# Patient Record
Sex: MALE | Race: WHITE | NOT HISPANIC OR LATINO | Employment: OTHER | URBAN - METROPOLITAN AREA
[De-identification: names, ages, dates, MRNs, and addresses within clinical notes are randomized per-mention and may not be internally consistent; named-entity substitution may affect disease eponyms.]

---

## 2017-01-02 ENCOUNTER — HOSPITAL ENCOUNTER (OUTPATIENT)
Facility: HOSPITAL | Age: 57
Setting detail: OBSERVATION
LOS: 1 days | Discharge: HOME | End: 2017-01-07
Attending: EMERGENCY MEDICINE | Admitting: INTERNAL MEDICINE

## 2017-01-02 DIAGNOSIS — N13.30 HYDRONEPHROSIS, LEFT: ICD-10-CM

## 2017-01-02 DIAGNOSIS — I10 ESSENTIAL HYPERTENSION: ICD-10-CM

## 2017-01-02 DIAGNOSIS — N23 RENAL COLIC ON LEFT SIDE: ICD-10-CM

## 2017-01-02 DIAGNOSIS — IMO0001 INSULIN DEPENDENT DIABETES MELLITUS: ICD-10-CM

## 2017-01-02 DIAGNOSIS — N20.1 URETEROLITHIASIS: Primary | ICD-10-CM

## 2017-01-02 DIAGNOSIS — N13.4 HYDROURETER, LEFT: ICD-10-CM

## 2017-01-02 PROCEDURE — 96374 THER/PROPH/DIAG INJ IV PUSH: CPT

## 2017-01-02 PROCEDURE — 99283 EMERGENCY DEPT VISIT LOW MDM: CPT

## 2017-01-02 NOTE — IP AVS SNAPSHOT
AFTER VISIT SUMMARY             Thomas Felder           About your hospitalization     You were admitted on:  January 3, 2017 You last received care in the:  [address]       Procedures & Surgeries      Procedure(s) (LRB):  LEFT URETEROSCOPY WITH  LASER LITHOTRIPSY, AND  STONE BASKETING AND LEFT URETERAL STENT (Left)     1/2/2017 - 1/6/2017     Surgeon(s):  Alex Stanton MD  -------------------      Medications    If you or your caregiver advised us that you are currently taking a medication and that medication is marked below as “Resume”, this simply indicates that we have reviewed those medications to make sure our new therapy recommendations do not interfere.  If you have concerns about medications other than those new ones which we are prescribing today, please consult the physician who prescribed them (or your primary physician).  Our review of your home medications is not meant to indicate that we are directing their use.             Your Medications      START taking these medications     levoFLOXacin 500 MG tablet   Take 1 tablet by mouth Daily for 3 days. Indications: Urinary Tract Infection   Last time this was given:  1/6/2017  5:00 PM   Commonly known as:  LEVAQUIN           oxyCODONE-acetaminophen 7.5-325 MG per tablet   Take 1 tablet by mouth Every 4 (Four) Hours As Needed for severe pain (7-10).   Commonly known as:  PERCOCET             CONTINUE taking these medications     albuterol 108 (90 BASE) MCG/ACT inhaler   Inhale 2 puffs Every 4 (Four) Hours As Needed for wheezing.   Commonly known as:  PROVENTIL HFA;VENTOLIN HFA           aspirin 325 MG tablet   Take 325 mg by mouth Daily.   Last time this was given:  1/7/2017  8:22 AM           cetirizine 10 MG tablet   Take 10 mg by mouth Daily.   Last time this was given:  1/7/2017  8:22 AM   Commonly known as:  zyrTEC           dicyclomine 20 MG tablet   Take 20 mg by mouth Daily As Needed.   Commonly known as:  BENTYL           furosemide 40 MG tablet   Take 40 mg by mouth Daily.   Commonly known as:  LASIX           HUMIRA 40 MG/0.8ML Prefilled Syringe Kit injection   Inject 40 mg under the skin 1 (One) Time.   Generic drug:  adalimumab           HYDROcodone-acetaminophen  MG per tablet   Take 1 tablet by mouth 2 (Two) Times a Day.   Last time this was given:  1/7/2017  8:22 AM   Commonly known as:  NORCO           insulin aspart 100 UNIT/ML solution pen-injector sc pen   Inject 30 Units under the skin 3 (Three) Times a Day With Meals.   Commonly known as:  novoLOG FLEXPEN           insulin aspart prot-insulin aspart (70-30) 100 UNIT/ML injection   Inject 60 Units under the skin 2 (Two) Times a Day With Meals.   Commonly known as:  novoLOG 70/30           INVOKANA PO   Take 100 mg by mouth Daily.           isosorbide mononitrate 30 MG 24 hr tablet   Take 30 mg by mouth Daily.   Last time this was given:  1/7/2017  8:22 AM   Commonly known as:  IMDUR           lisinopril 2.5 MG tablet   Take 2.5 mg by mouth Daily.   Commonly known as:  PRINIVIL,ZESTRIL           metFORMIN 1000 MG tablet   Take 1,000 mg by mouth 2 (Two) Times a Day With Meals.   Commonly known as:  GLUCOPHAGE           metoprolol succinate  MG 24 hr tablet   Take 100 mg by mouth 2 (Two) Times a Day.   Last time this was given:  1/7/2017  8:22 AM   Commonly known as:  TOPROL-XL           omeprazole 20 MG capsule   Take 20 mg by mouth Daily.   Commonly known as:  priLOSEC           potassium chloride 20 MEQ packet   Take 20 mEq by mouth Daily.   Commonly known as:  KLOR-CON           raNITIdine 150 MG tablet   Take 150 mg by mouth 2 (Two) Times a Day.   Commonly known as:  ZANTAC           VICTOZA 18 MG/3ML solution pen-injector   Inject 1.8 mg under the skin.   Generic drug:  Liraglutide                Where to Get Your Medications      These medications were sent to [facility] [address] [phone] [facility] [phone]   [address] [address]     Phone:  [phone]     levoFLOXacin 500 MG tablet         Information about where to get these medications is not yet available     ! Ask your nurse or doctor about these medications     oxyCODONE-acetaminophen 7.5-325 MG per tablet                  Your Medications      Your Medication List           Morning Noon Evening Bedtime As Needed    albuterol 108 (90 BASE) MCG/ACT inhaler   Inhale 2 puffs Every 4 (Four) Hours As Needed for wheezing.   Commonly known as:  PROVENTIL HFA;VENTOLIN HFA                                   aspirin 325 MG tablet   Take 325 mg by mouth Daily.                                   cetirizine 10 MG tablet   Take 10 mg by mouth Daily.   Commonly known as:  zyrTEC                                   dicyclomine 20 MG tablet   Take 20 mg by mouth Daily As Needed.   Commonly known as:  BENTYL                                   furosemide 40 MG tablet   Take 40 mg by mouth Daily.   Commonly known as:  LASIX                                   HUMIRA 40 MG/0.8ML Prefilled Syringe Kit injection   Inject 40 mg under the skin 1 (One) Time.   Generic drug:  adalimumab                                HYDROcodone-acetaminophen  MG per tablet   Take 1 tablet by mouth 2 (Two) Times a Day.   Commonly known as:  NORCO                                      insulin aspart 100 UNIT/ML solution pen-injector sc pen   Inject 30 Units under the skin 3 (Three) Times a Day With Meals.   Commonly known as:  novoLOG FLEXPEN                                            insulin aspart prot-insulin aspart (70-30) 100 UNIT/ML injection   Inject 60 Units under the skin 2 (Two) Times a Day With Meals.   Commonly known as:  novoLOG 70/30                                      INVOKANA PO   Take 100 mg by mouth Daily.                                   isosorbide mononitrate 30 MG 24 hr tablet   Take 30 mg by mouth Daily.   Commonly known as:  IMDUR                                    levoFLOXacin 500 MG tablet   Take 1 tablet by mouth Daily for 3 days. Indications: Urinary Tract Infection   Commonly known as:  LEVAQUIN                                   lisinopril 2.5 MG tablet   Take 2.5 mg by mouth Daily.   Commonly known as:  PRINIVIL,ZESTRIL                                   metFORMIN 1000 MG tablet   Take 1,000 mg by mouth 2 (Two) Times a Day With Meals.   Commonly known as:  GLUCOPHAGE                                metoprolol succinate  MG 24 hr tablet   Take 100 mg by mouth 2 (Two) Times a Day.   Commonly known as:  TOPROL-XL                                   omeprazole 20 MG capsule   Take 20 mg by mouth Daily.   Commonly known as:  priLOSEC                                   oxyCODONE-acetaminophen 7.5-325 MG per tablet   Take 1 tablet by mouth Every 4 (Four) Hours As Needed for severe pain (7-10).   Commonly known as:  PERCOCET                                   potassium chloride 20 MEQ packet   Take 20 mEq by mouth Daily.   Commonly known as:  KLOR-CON                                   raNITIdine 150 MG tablet   Take 150 mg by mouth 2 (Two) Times a Day.   Commonly known as:  ZANTAC                                      VICTOZA 18 MG/3ML solution pen-injector   Inject 1.8 mg under the skin.   Generic drug:  Liraglutide                                         Instructions for After Discharge        Discharge References/Attachments     LITHOTRIPSY FOR KIDNEY STONES (ENGLISH)    LITHOTRIPSY, CARE AFTER (ENGLISH)    KIDNEY STONES (ENGLISH)       Follow-ups for After Discharge        [employer] Signup     [facility] [employer] allows you to send messages to your doctor, view your test results, renew your prescriptions, schedule appointments, and more. To sign up, go to [employer] and click on the Sign Up Now link in the New User? box. Enter your [employer] Activation Code exactly as it appears below along with the last four digits of your Social Security Number and your  Date of Birth () to complete the sign-up process. If you do not sign up before the expiration date, you must request a new code.    [employer] Activation Code: [email or web address]  Expires: 2017  1:55 PM    If you have questions, you can email [email or web address] or call [phone] to talk to our [email or web address] staff. Remember, [email or web address] is NOT to be used for urgent needs. For medical emergencies, dial 911.           Summary of Your Hospitalization        Reason for Hospitalization     Your primary diagnosis was:  Ureterolithiasis    Your diagnoses also included:  High Blood Pressure, Spasmodic Colon, Diabetes      Care Providers     Provider Service Role Specialty    Rosie Martines II,  Medicine Attending Provider Hospitalist    Alex Stanton MD Urology Consulting Physician  Urology    Alex Statnon MD Urology Surgeon  Urology      Your Allergies  Date Reviewed: 2017    Allergen Reactions    Sulfa Antibiotics Swelling    Generalized swelling       Patient Belongings Returned     Document Return of Belongings Flowsheet     Were the patient bedside belongings sent home?   Yes   Belongings Retrieved from Security & Sent Home   N/A    Belongings Sent to Safe   --   Medications Retrieved from Pharmacy & Sent Home   N/A              More Information      Lithotripsy  Lithotripsy is a treatment that can sometimes help eliminate kidney stones and pain that they cause. A form of lithotripsy, also known as extracorporeal shock wave lithotripsy, is a nonsurgical procedure that helps your body rid itself of the kidney stone when it is too big to pass on its own. Extracorporeal shock wave lithotripsy is a method of crushing a kidney stone with shock waves. These shock waves pass through your body and are focused on your stone. They cause the kidney stones to crumble while still in the urinary tract. It is then easier for the smaller pieces of stone to pass in the urine.  Lithotripsy usually takes about an hour. It  is done in a hospital, a lithotripsy center, or a mobile unit. It usually does not require an overnight stay. Your health care provider will instruct you on preparation for the procedure. Your health care provider will tell you what to expect afterward.  LET YOUR HEALTH CARE PROVIDER KNOW ABOUT:  · Any allergies you have.  · All medicines you are taking, including vitamins, herbs, eye drops, creams, and over-the-counter medicines.  · Previous problems you or members of your family have had with the use of anesthetics.  · Any blood disorders you have.  · Previous surgeries you have had.  · Medical conditions you have.  RISKS AND COMPLICATIONS  Generally, lithotripsy for kidney stones is a safe procedure. However, as with any procedure, complications can occur. Possible complications include:  · Infection.  · Bleeding of the kidney.  · Bruising of the kidney or skin.  · Obstruction of the ureter.  · Failure of the stone to fragment.  BEFORE THE PROCEDURE  · Do not eat or drink for 6-8 hours prior to the procedure. You may, however, take the medications with a sip of water that your physician instructs you to take  · Do not take aspirin or aspirin-containing products for 7 days prior to your procedure  · Do not take nonsteroidal anti-inflammatory products for 7 days prior to your procedure  PROCEDURE  A stent (flexible tube with holes) may be placed in your ureter. The ureter is the tube that transports the urine from the kidneys to the bladder. Your health care provider may place a stent before the procedure. This will help keep urine flowing from the kidney if the fragments of the stone block the ureter. You may have an IV tube placed in one of your veins to give you fluids and medicines. These medicines may help you relax or make you sleep. During the procedure, you will lie comfortably on a fluid-filled cushion or in a warm-water bath. After an X-ray or ultrasound exam to locate your stone, shock waves are aimed at  the stone. If you are awake, you may feel a tapping sensation as the shock waves pass through your body. If large stone particles remain after treatment, a second procedure may be necessary at a later date.  For comfort during the test:  · Relax as much as possible.  · Try to remain still as much as possible.  · Try to follow instructions to speed up the test.  · Let your health care provider know if you are uncomfortable, anxious, or in pain.  AFTER THE PROCEDURE   After surgery, you will be taken to the recovery area. A nurse will watch and check your progress. Once you're awake, stable, and taking fluids well, you will be allowed to go home as long as there are no problems. You will also be allowed to pass your urine before discharge. You may be given antibiotics to help prevent infection. You may also be prescribed pain medicine if needed. In a week or two, your health care provider may remove your stent, if you have one. You may first have an X-ray exam to check on how successful the fragmentation of your stone has been and how much of the stone has passed. Your health care provider will check to see whether or not stone particles remain.  SEEK IMMEDIATE MEDICAL CARE IF:  · You develop a fever or shaking chills.  · Your pain is not relieved by medicine.  · You feel sick to your stomach (nauseated) and you vomit.  · You develop heavy bleeding.  · You have difficulty urinating.  · You start to pass your stent from your penis.     This information is not intended to replace advice given to you by your health care provider. Make sure you discuss any questions you have with your health care provider.     Document Released: 12/15/2001 Document Revised: 01/08/2016 Document Reviewed: 07/03/2014  [employer] Interactive Patient Education ©2016 [employer]          Lithotripsy, Care After  Refer to this sheet in the next few weeks. These instructions provide you with information on caring for yourself after your  procedure. Your health care provider may also give you more specific instructions. Your treatment has been planned according to current medical practices, but problems sometimes occur. Call your health care provider if you have any problems or questions after your procedure.  WHAT TO EXPECT AFTER THE PROCEDURE   · Your urine may have a red tinge for a few days after treatment. Blood loss is usually minimal.  · You may have soreness in the back or flank area. This usually goes away after a few days. The procedure can cause blotches or bruises on the back where the pressure wave enters the skin. These marks usually cause only minimal discomfort and should disappear in a short time.  · Stone fragments should begin to pass within 24 hours of treatment. However, a delayed passage is not unusual.  · You may have pain, discomfort, and feel sick to your stomach (nauseated) when the crushed fragments of stone are passed down the tube from the kidney to the bladder. Stone fragments can pass soon after the procedure and may last for up to 4-8 weeks.  · A small number of patients may have severe pain when stone fragments are not able to pass, which leads to an obstruction.  · If your stone is greater than 1 inch (2.5 cm) in diameter or if you have multiple stones that have a combined diameter greater than 1 inch (2.5 cm), you may require more than one treatment.  · If you had a stent placed prior to your procedure, you may experience some discomfort, especially during urination. You may experience the pain or discomfort in your flank or back, or you may experience a sharp pain or discomfort at the base of your penis or in your lower abdomen. The discomfort usually lasts only a few minutes after urinating.  HOME CARE INSTRUCTIONS   · Rest at home until you feel your energy improving.  · Only take over-the-counter or prescription medicines for pain, discomfort, or fever as directed by your health care provider. Depending on the  "type of lithotripsy, you may need to take antibiotics and anti-inflammatory medicines for a few days.  · Drink enough water and fluids to keep your urine clear or pale yellow. This helps \"flush\" your kidneys. It helps pass any remaining pieces of stone and prevents stones from coming back.  · Most people can resume daily activities within 1-2 days after standard lithotripsy. It can take longer to recover from laser and percutaneous lithotripsy.  · Strain all urine through the provided strainer. Keep all particulate matter and stones for your health care provider to see. The stone may be as small as a grain of salt. It is very important to use the strainer each and every time you pass your urine. Any stones that are found can be sent to a medical lab for examination.  · Visit your health care provider for a follow-up appointment in a few weeks. Your doctor may remove your stent if you have one. Your health care provider will also check to see whether stone particles still remain.  SEEK MEDICAL CARE IF:   · Your pain is not relieved by medicine.  · You have a lasting nauseous feeling.  · You feel there is too much blood in the urine.  · You develop persistent problems with frequent or painful urination that does not at least partially improve after 2 days following the procedure.  · You have a congested cough.  · You feel lightheaded.  · You develop a rash or any other signs that might suggest an allergic problem.  · You develop any reaction or side effects to your medicine(s).  SEEK IMMEDIATE MEDICAL CARE IF:   · You experience severe back or flank pain or both.  · You see nothing but blood when you urinate.  · You cannot pass any urine at all.  · You have a fever or shaking chills.  · You develop shortness of breath, difficulty breathing, or chest pain.  · You develop vomiting that will not stop after 6-8 hours.  · You have a fainting episode.     This information is not intended to replace advice given to you by " your health care provider. Make sure you discuss any questions you have with your health care provider.     Document Released: 01/06/2009 Document Revised: 09/07/2016 Document Reviewed: 07/03/2014  [employer] Interactive Patient Education ©2016 [employer]          Kidney Stones  Kidney stones (urolithiasis) are deposits that form inside your kidneys. The intense pain is caused by the stone moving through the urinary tract. When the stone moves, the ureter goes into spasm around the stone. The stone is usually passed in the urine.   CAUSES   · A disorder that makes certain neck glands produce too much parathyroid hormone (primary hyperparathyroidism).  · A buildup of uric acid crystals, similar to gout in your joints.  · Narrowing (stricture) of the ureter.  · A kidney obstruction present at birth (congenital obstruction).  · Previous surgery on the kidney or ureters.  · Numerous kidney infections.  SYMPTOMS   · Feeling sick to your stomach (nauseous).  · Throwing up (vomiting).  · Blood in the urine (hematuria).  · Pain that usually spreads (radiates) to the groin.  · Frequency or urgency of urination.  DIAGNOSIS   · Taking a history and physical exam.  · Blood or urine tests.  · CT scan.  · Occasionally, an examination of the inside of the urinary bladder (cystoscopy) is performed.  TREATMENT   · Observation.  · Increasing your fluid intake.  · Extracorporeal shock wave lithotripsy--This is a noninvasive procedure that uses shock waves to break up kidney stones.  · Surgery may be needed if you have severe pain or persistent obstruction. There are various surgical procedures. Most of the procedures are performed with the use of small instruments. Only small incisions are needed to accommodate these instruments, so recovery time is minimized.  The size, location, and chemical composition are all important variables that will determine the proper choice of action for you. Talk to your health care provider to better  understand your situation so that you will minimize the risk of injury to yourself and your kidney.   HOME CARE INSTRUCTIONS   · Drink enough water and fluids to keep your urine clear or pale yellow. This will help you to pass the stone or stone fragments.  · Strain all urine through the provided strainer. Keep all particulate matter and stones for your health care provider to see. The stone causing the pain may be as small as a grain of salt. It is very important to use the strainer each and every time you pass your urine. The collection of your stone will allow your health care provider to analyze it and verify that a stone has actually passed. The stone analysis will often identify what you can do to reduce the incidence of recurrences.  · Only take over-the-counter or prescription medicines for pain, discomfort, or fever as directed by your health care provider.  · Keep all follow-up visits as told by your health care provider. This is important.  · Get follow-up X-rays if required. The absence of pain does not always mean that the stone has passed. It may have only stopped moving. If the urine remains completely obstructed, it can cause loss of kidney function or even complete destruction of the kidney. It is your responsibility to make sure X-rays and follow-ups are completed. Ultrasounds of the kidney can show blockages and the status of the kidney. Ultrasounds are not associated with any radiation and can be performed easily in a matter of minutes.  · Make changes to your daily diet as told by your health care provider. You may be told to:  ¨ Limit the amount of salt that you eat.  ¨ Eat 5 or more servings of fruits and vegetables each day.  ¨ Limit the amount of meat, poultry, fish, and eggs that you eat.  · Collect a 24-hour urine sample as told by your health care provider. You may need to collect another urine sample every 6-12 months.  SEEK MEDICAL CARE IF:  · You experience pain that is progressive and  unresponsive to any pain medicine you have been prescribed.  SEEK IMMEDIATE MEDICAL CARE IF:   · Pain cannot be controlled with the prescribed medicine.  · You have a fever or shaking chills.  · The severity or intensity of pain increases over 18 hours and is not relieved by pain medicine.  · You develop a new onset of abdominal pain.  · You feel faint or pass out.  · You are unable to urinate.     This information is not intended to replace advice given to you by your health care provider. Make sure you discuss any questions you have with your health care provider.     Document Released: 12/18/2006 Document Revised: 09/07/2016 Document Reviewed: 05/21/2014  [employer] Interactive Patient Education ©2016 [employer]            SYMPTOMS OF A STROKE    Call 911 or have someone take you to the Emergency Department if you have any of the following:    · Sudden numbness or weakness of your face, arm or leg especially on one side of the body  · Sudden confusion, diffiiculty speaking or trouble understanding   · Changes in your vision or loss of sight in one eye  · Sudden severe headache with no known cause  · sudden dizziness, trouble walking, loss of balance or coordination    It is important to seek emergency care right away if you have further stroke symptoms. If you get emergency help quickly, the powerful clot-dissolving medicines can reduce the disabilities caused by a stroke.     For more information:    American Stroke Association  1-988-1-STROKE  www.strokeassociation.org           IF YOU SMOKE OR USE TOBACCO PLEASE READ THE FOLLOWING:    Why is smoking bad for me?  Smoking increases the risk of heart disease, lung disease, vascular disease, stroke, and cancer.     If you smoke, STOP!    If you would like more information on quitting smoking, please visit the [employer] website: [email or web address].co/iBoxPayate/healthier-[email or web address]   This link will provide additional resources including the QUIT  line and the Beat the Pack support groups.     For more information:    American Cancer Society  (231) 495-7747    American Heart Association  1-267.363.1555               YOU ARE THE MOST IMPORTANT FACTOR IN YOUR RECOVERY.     Follow all instructions carefully.     I have reviewed my discharge instructions with my nurse, including the following information, if applicable:     Information about my illness and diagnosis   Follow up appointments (including lab draws)   Wound Care   Equipment Needs   Medications (new and continuing) along with side effects   Preventative information such as vaccines and smoking cessations   Diet   Pain   I know when to contact my Doctor's office or seek emergency care      I want my nurse to describe the side effects of my medications: YES NO   If the answer is no, I understand the side effects of my medications: YES NO   My nurse described the side effects of my medications in a way that I could understand: YES NO   I have taken my personal belongings and my own medications with me at discharge: YES NO            I have received this information and my questions have been answered. I have discussed any concerns I see with this plan with the nurse or physician. I understand these instructions.    Signature of Patient or Responsible Person: _____________________________________    Date: _________________  Time: __________________    Signature of Healthcare Provider: _______________________________________  Date: _________________  Time: __________________

## 2017-01-03 ENCOUNTER — APPOINTMENT (OUTPATIENT)
Dept: CT IMAGING | Facility: HOSPITAL | Age: 57
End: 2017-01-03

## 2017-01-03 PROBLEM — K58.9 IBS (IRRITABLE BOWEL SYNDROME): Status: ACTIVE | Noted: 2017-01-03

## 2017-01-03 PROBLEM — E11.9 DIABETES (HCC): Status: ACTIVE | Noted: 2017-01-03

## 2017-01-03 PROBLEM — N20.1 URETEROLITHIASIS: Status: ACTIVE | Noted: 2017-01-03

## 2017-01-03 PROBLEM — I10 ESSENTIAL HYPERTENSION: Status: ACTIVE | Noted: 2017-01-03

## 2017-01-03 LAB
ALBUMIN SERPL-MCNC: 4.4 G/DL (ref 3.2–4.8)
ALBUMIN/GLOB SERPL: 1.5 G/DL (ref 1.5–2.5)
ALP SERPL-CCNC: 115 U/L (ref 25–100)
ALT SERPL W P-5'-P-CCNC: 50 U/L (ref 7–40)
ANION GAP SERPL CALCULATED.3IONS-SCNC: 5 MMOL/L (ref 3–11)
AST SERPL-CCNC: 49 U/L (ref 0–33)
BACTERIA UR QL AUTO: ABNORMAL /HPF
BASOPHILS # BLD AUTO: 0.02 10*3/MM3 (ref 0–0.2)
BASOPHILS NFR BLD AUTO: 0.2 % (ref 0–1)
BILIRUB SERPL-MCNC: 0.5 MG/DL (ref 0.3–1.2)
BILIRUB UR QL STRIP: NEGATIVE
BUN BLD-MCNC: 9 MG/DL (ref 9–23)
BUN/CREAT SERPL: 11.3 (ref 7–25)
CALCIUM SPEC-SCNC: 9.8 MG/DL (ref 8.7–10.4)
CHLORIDE SERPL-SCNC: 102 MMOL/L (ref 99–109)
CLARITY UR: ABNORMAL
CO2 SERPL-SCNC: 34 MMOL/L (ref 20–31)
COLOR UR: YELLOW
CREAT BLD-MCNC: 0.8 MG/DL (ref 0.6–1.3)
DEPRECATED RDW RBC AUTO: 44.2 FL (ref 37–54)
EOSINOPHIL # BLD AUTO: 0.17 10*3/MM3 (ref 0.1–0.3)
EOSINOPHIL NFR BLD AUTO: 2 % (ref 0–3)
ERYTHROCYTE [DISTWIDTH] IN BLOOD BY AUTOMATED COUNT: 13.4 % (ref 11.3–14.5)
GFR SERPL CREATININE-BSD FRML MDRD: 100 ML/MIN/1.73
GLOBULIN UR ELPH-MCNC: 3 GM/DL
GLUCOSE BLD-MCNC: 169 MG/DL (ref 70–100)
GLUCOSE BLDC GLUCOMTR-MCNC: 144 MG/DL (ref 70–130)
GLUCOSE BLDC GLUCOMTR-MCNC: 169 MG/DL (ref 70–130)
GLUCOSE BLDC GLUCOMTR-MCNC: 191 MG/DL (ref 70–130)
GLUCOSE BLDC GLUCOMTR-MCNC: 251 MG/DL (ref 70–130)
GLUCOSE UR STRIP-MCNC: ABNORMAL MG/DL
HBA1C MFR BLD: 9.1 % (ref 4.8–5.6)
HCT VFR BLD AUTO: 44.4 % (ref 38.9–50.9)
HGB BLD-MCNC: 14.8 G/DL (ref 13.1–17.5)
HGB UR QL STRIP.AUTO: ABNORMAL
HYALINE CASTS UR QL AUTO: ABNORMAL /LPF
IMM GRANULOCYTES # BLD: 0.02 10*3/MM3 (ref 0–0.03)
IMM GRANULOCYTES NFR BLD: 0.2 % (ref 0–0.6)
KETONES UR QL STRIP: NEGATIVE
LEUKOCYTE ESTERASE UR QL STRIP.AUTO: NEGATIVE
LYMPHOCYTES # BLD AUTO: 2.65 10*3/MM3 (ref 0.6–4.8)
LYMPHOCYTES NFR BLD AUTO: 31.7 % (ref 24–44)
MCH RBC QN AUTO: 30.2 PG (ref 27–31)
MCHC RBC AUTO-ENTMCNC: 33.3 G/DL (ref 32–36)
MCV RBC AUTO: 90.6 FL (ref 80–99)
MONOCYTES # BLD AUTO: 0.66 10*3/MM3 (ref 0–1)
MONOCYTES NFR BLD AUTO: 7.9 % (ref 0–12)
NEUTROPHILS # BLD AUTO: 4.85 10*3/MM3 (ref 1.5–8.3)
NEUTROPHILS NFR BLD AUTO: 58 % (ref 41–71)
NITRITE UR QL STRIP: NEGATIVE
PH UR STRIP.AUTO: 7 [PH] (ref 5–8)
PLATELET # BLD AUTO: 294 10*3/MM3 (ref 150–450)
PMV BLD AUTO: 9 FL (ref 6–12)
POTASSIUM BLD-SCNC: 3.7 MMOL/L (ref 3.5–5.5)
PROT SERPL-MCNC: 7.4 G/DL (ref 5.7–8.2)
PROT UR QL STRIP: NEGATIVE
RBC # BLD AUTO: 4.9 10*6/MM3 (ref 4.2–5.76)
RBC # UR: ABNORMAL /HPF
REF LAB TEST METHOD: ABNORMAL
SODIUM BLD-SCNC: 141 MMOL/L (ref 132–146)
SP GR UR STRIP: 1.02 (ref 1–1.03)
SQUAMOUS #/AREA URNS HPF: ABNORMAL /HPF
UROBILINOGEN UR QL STRIP: ABNORMAL
WBC NRBC COR # BLD: 8.37 10*3/MM3 (ref 3.5–10.8)
WBC UR QL AUTO: ABNORMAL /HPF

## 2017-01-03 PROCEDURE — 25010000002 KETOROLAC TROMETHAMINE PER 15 MG: Performed by: PHYSICIAN ASSISTANT

## 2017-01-03 PROCEDURE — G0378 HOSPITAL OBSERVATION PER HR: HCPCS

## 2017-01-03 PROCEDURE — 99220 PR INITIAL OBSERVATION CARE/DAY 70 MINUTES: CPT | Performed by: INTERNAL MEDICINE

## 2017-01-03 PROCEDURE — 96361 HYDRATE IV INFUSION ADD-ON: CPT

## 2017-01-03 PROCEDURE — 63710000001 INSULIN LISPRO (HUMAN) PER 5 UNITS: Performed by: INTERNAL MEDICINE

## 2017-01-03 PROCEDURE — 80053 COMPREHEN METABOLIC PANEL: CPT | Performed by: PHYSICIAN ASSISTANT

## 2017-01-03 PROCEDURE — 85025 COMPLETE CBC W/AUTO DIFF WBC: CPT | Performed by: PHYSICIAN ASSISTANT

## 2017-01-03 PROCEDURE — 82962 GLUCOSE BLOOD TEST: CPT

## 2017-01-03 PROCEDURE — 63710000001 INSULIN DETEMIR PER 5 UNITS: Performed by: INTERNAL MEDICINE

## 2017-01-03 PROCEDURE — 74176 CT ABD & PELVIS W/O CONTRAST: CPT

## 2017-01-03 PROCEDURE — 83036 HEMOGLOBIN GLYCOSYLATED A1C: CPT | Performed by: INTERNAL MEDICINE

## 2017-01-03 PROCEDURE — 81001 URINALYSIS AUTO W/SCOPE: CPT | Performed by: PHYSICIAN ASSISTANT

## 2017-01-03 RX ORDER — NICOTINE POLACRILEX 4 MG
15 LOZENGE BUCCAL AS NEEDED
Status: DISCONTINUED | OUTPATIENT
Start: 2017-01-03 | End: 2017-01-07 | Stop reason: HOSPADM

## 2017-01-03 RX ORDER — DEXTROSE MONOHYDRATE 25 G/50ML
25 INJECTION, SOLUTION INTRAVENOUS AS NEEDED
Status: DISCONTINUED | OUTPATIENT
Start: 2017-01-03 | End: 2017-01-07 | Stop reason: HOSPADM

## 2017-01-03 RX ORDER — CETIRIZINE HYDROCHLORIDE 10 MG/1
10 TABLET ORAL DAILY
COMMUNITY

## 2017-01-03 RX ORDER — RANITIDINE 150 MG/1
150 TABLET ORAL 2 TIMES DAILY
COMMUNITY

## 2017-01-03 RX ORDER — ONDANSETRON 2 MG/ML
4 INJECTION INTRAMUSCULAR; INTRAVENOUS ONCE
Status: COMPLETED | OUTPATIENT
Start: 2017-01-03 | End: 2017-01-06

## 2017-01-03 RX ORDER — HYDROCODONE BITARTRATE AND ACETAMINOPHEN 5; 325 MG/1; MG/1
1 TABLET ORAL ONCE
Status: COMPLETED | OUTPATIENT
Start: 2017-01-03 | End: 2017-01-03

## 2017-01-03 RX ORDER — CETIRIZINE HYDROCHLORIDE 10 MG/1
10 TABLET ORAL DAILY
Status: DISCONTINUED | OUTPATIENT
Start: 2017-01-03 | End: 2017-01-07 | Stop reason: HOSPADM

## 2017-01-03 RX ORDER — METOPROLOL SUCCINATE 100 MG/1
100 TABLET, EXTENDED RELEASE ORAL 2 TIMES DAILY
Status: DISCONTINUED | OUTPATIENT
Start: 2017-01-03 | End: 2017-01-07 | Stop reason: HOSPADM

## 2017-01-03 RX ORDER — METOPROLOL SUCCINATE 100 MG/1
100 TABLET, EXTENDED RELEASE ORAL 2 TIMES DAILY
COMMUNITY

## 2017-01-03 RX ORDER — ACETAMINOPHEN 325 MG/1
650 TABLET ORAL EVERY 6 HOURS PRN
Status: DISCONTINUED | OUTPATIENT
Start: 2017-01-03 | End: 2017-01-07 | Stop reason: HOSPADM

## 2017-01-03 RX ORDER — ASPIRIN 325 MG
325 TABLET ORAL DAILY
COMMUNITY

## 2017-01-03 RX ORDER — FUROSEMIDE 40 MG/1
40 TABLET ORAL DAILY
COMMUNITY

## 2017-01-03 RX ORDER — OMEPRAZOLE 20 MG/1
20 CAPSULE, DELAYED RELEASE ORAL DAILY
COMMUNITY

## 2017-01-03 RX ORDER — TAMSULOSIN HYDROCHLORIDE 0.4 MG/1
0.4 CAPSULE ORAL ONCE
Status: COMPLETED | OUTPATIENT
Start: 2017-01-03 | End: 2017-01-03

## 2017-01-03 RX ORDER — HYDROCODONE BITARTRATE AND ACETAMINOPHEN 10; 325 MG/1; MG/1
1 TABLET ORAL 2 TIMES DAILY
Status: DISCONTINUED | OUTPATIENT
Start: 2017-01-03 | End: 2017-01-07 | Stop reason: HOSPADM

## 2017-01-03 RX ORDER — ALBUTEROL SULFATE 90 UG/1
2 AEROSOL, METERED RESPIRATORY (INHALATION) EVERY 4 HOURS PRN
COMMUNITY

## 2017-01-03 RX ORDER — ISOSORBIDE MONONITRATE 30 MG/1
30 TABLET, EXTENDED RELEASE ORAL DAILY
COMMUNITY

## 2017-01-03 RX ORDER — POTASSIUM CHLORIDE 1.5 G/1.77G
20 POWDER, FOR SOLUTION ORAL DAILY
COMMUNITY

## 2017-01-03 RX ORDER — DICYCLOMINE HCL 20 MG
20 TABLET ORAL DAILY PRN
COMMUNITY

## 2017-01-03 RX ORDER — LISINOPRIL 2.5 MG/1
2.5 TABLET ORAL DAILY
COMMUNITY

## 2017-01-03 RX ORDER — SODIUM CHLORIDE 9 MG/ML
100 INJECTION, SOLUTION INTRAVENOUS CONTINUOUS
Status: ACTIVE | OUTPATIENT
Start: 2017-01-04 | End: 2017-01-05

## 2017-01-03 RX ORDER — HYDROCODONE BITARTRATE AND ACETAMINOPHEN 10; 325 MG/1; MG/1
1 TABLET ORAL 2 TIMES DAILY
COMMUNITY

## 2017-01-03 RX ORDER — ISOSORBIDE MONONITRATE 30 MG/1
30 TABLET, EXTENDED RELEASE ORAL DAILY
Status: DISCONTINUED | OUTPATIENT
Start: 2017-01-03 | End: 2017-01-07 | Stop reason: HOSPADM

## 2017-01-03 RX ORDER — TAMSULOSIN HYDROCHLORIDE 0.4 MG/1
0.4 CAPSULE ORAL NIGHTLY
Status: DISCONTINUED | OUTPATIENT
Start: 2017-01-03 | End: 2017-01-07 | Stop reason: HOSPADM

## 2017-01-03 RX ORDER — FAMOTIDINE 20 MG/1
20 TABLET, FILM COATED ORAL 2 TIMES DAILY
Status: DISCONTINUED | OUTPATIENT
Start: 2017-01-03 | End: 2017-01-06

## 2017-01-03 RX ORDER — SODIUM CHLORIDE 0.9 % (FLUSH) 0.9 %
10 SYRINGE (ML) INJECTION AS NEEDED
Status: DISCONTINUED | OUTPATIENT
Start: 2017-01-03 | End: 2017-01-06

## 2017-01-03 RX ORDER — INSULIN ASPART 100 [IU]/ML
60 INJECTION, SUSPENSION SUBCUTANEOUS 2 TIMES DAILY WITH MEALS
COMMUNITY

## 2017-01-03 RX ORDER — KETOROLAC TROMETHAMINE 15 MG/ML
15 INJECTION, SOLUTION INTRAMUSCULAR; INTRAVENOUS ONCE
Status: COMPLETED | OUTPATIENT
Start: 2017-01-03 | End: 2017-01-03

## 2017-01-03 RX ORDER — ASPIRIN 325 MG
325 TABLET ORAL DAILY
Status: DISCONTINUED | OUTPATIENT
Start: 2017-01-03 | End: 2017-01-07 | Stop reason: HOSPADM

## 2017-01-03 RX ORDER — HYDROCODONE BITARTRATE AND ACETAMINOPHEN 5; 325 MG/1; MG/1
1 TABLET ORAL ONCE AS NEEDED
Status: COMPLETED | OUTPATIENT
Start: 2017-01-03 | End: 2017-01-03

## 2017-01-03 RX ORDER — SODIUM CHLORIDE 9 MG/ML
100 INJECTION, SOLUTION INTRAVENOUS CONTINUOUS
Status: ACTIVE | OUTPATIENT
Start: 2017-01-03 | End: 2017-01-03

## 2017-01-03 RX ORDER — HYDROMORPHONE HYDROCHLORIDE 1 MG/ML
0.5 INJECTION, SOLUTION INTRAMUSCULAR; INTRAVENOUS; SUBCUTANEOUS ONCE
Status: DISCONTINUED | OUTPATIENT
Start: 2017-01-03 | End: 2017-01-06

## 2017-01-03 RX ADMIN — METOPROLOL SUCCINATE 100 MG: 100 TABLET, EXTENDED RELEASE ORAL at 06:45

## 2017-01-03 RX ADMIN — CETIRIZINE HYDROCHLORIDE 10 MG: 10 TABLET, FILM COATED ORAL at 10:57

## 2017-01-03 RX ADMIN — INSULIN LISPRO 8 UNITS: 100 INJECTION, SOLUTION INTRAVENOUS; SUBCUTANEOUS at 12:58

## 2017-01-03 RX ADMIN — HYDROCODONE BITARTRATE AND ACETAMINOPHEN 1 TABLET: 5; 325 TABLET ORAL at 21:36

## 2017-01-03 RX ADMIN — HYDROCODONE BITARTRATE AND ACETAMINOPHEN 1 TABLET: 10; 325 TABLET ORAL at 17:38

## 2017-01-03 RX ADMIN — SODIUM CHLORIDE 100 ML/HR: 9 INJECTION, SOLUTION INTRAVENOUS at 11:15

## 2017-01-03 RX ADMIN — ASPIRIN 325 MG ORAL TABLET 325 MG: 325 PILL ORAL at 10:56

## 2017-01-03 RX ADMIN — FAMOTIDINE 20 MG: 20 TABLET ORAL at 17:39

## 2017-01-03 RX ADMIN — ISOSORBIDE MONONITRATE 30 MG: 30 TABLET, EXTENDED RELEASE ORAL at 06:45

## 2017-01-03 RX ADMIN — METOPROLOL SUCCINATE 100 MG: 100 TABLET, EXTENDED RELEASE ORAL at 17:39

## 2017-01-03 RX ADMIN — HYDROCODONE BITARTRATE AND ACETAMINOPHEN 1 TABLET: 5; 325 TABLET ORAL at 04:21

## 2017-01-03 RX ADMIN — INSULIN LISPRO 3 UNITS: 100 INJECTION, SOLUTION INTRAVENOUS; SUBCUTANEOUS at 21:36

## 2017-01-03 RX ADMIN — FAMOTIDINE 20 MG: 20 TABLET ORAL at 10:55

## 2017-01-03 RX ADMIN — KETOROLAC TROMETHAMINE 15 MG: 15 INJECTION, SOLUTION INTRAMUSCULAR; INTRAVENOUS at 04:19

## 2017-01-03 RX ADMIN — SODIUM CHLORIDE 100 ML/HR: 9 INJECTION, SOLUTION INTRAVENOUS at 23:40

## 2017-01-03 RX ADMIN — TAMSULOSIN HYDROCHLORIDE 0.4 MG: 0.4 CAPSULE ORAL at 21:36

## 2017-01-03 RX ADMIN — INSULIN DETEMIR 15 UNITS: 100 INJECTION, SOLUTION SUBCUTANEOUS at 21:37

## 2017-01-03 RX ADMIN — HYDROCODONE BITARTRATE AND ACETAMINOPHEN 1 TABLET: 10; 325 TABLET ORAL at 10:58

## 2017-01-03 RX ADMIN — INSULIN LISPRO 3 UNITS: 100 INJECTION, SOLUTION INTRAVENOUS; SUBCUTANEOUS at 11:04

## 2017-01-03 RX ADMIN — TAMSULOSIN HYDROCHLORIDE 0.4 MG: 0.4 CAPSULE ORAL at 04:22

## 2017-01-03 RX ADMIN — SODIUM CHLORIDE 100 ML/HR: 9 INJECTION, SOLUTION INTRAVENOUS at 21:36

## 2017-01-03 NOTE — PLAN OF CARE
Problem: Patient Care Overview (Adult)  Goal: Plan of Care Review  Outcome: Ongoing (interventions implemented as appropriate)    01/03/17 1641   Coping/Psychosocial Response Interventions   Plan Of Care Reviewed With patient;spouse       Goal: Adult Individualization and Mutuality  Outcome: Ongoing (interventions implemented as appropriate)    01/03/17 1652   Individualization   Patient Specific Goals pain control       Goal: Discharge Needs Assessment  Outcome: Ongoing (interventions implemented as appropriate)    01/03/17 1509   Discharge Needs Assessment   Concerns To Be Addressed adjustment to diagnosis/illness concerns;basic needs concerns;no discharge needs identified   Readmission Within The Last 30 Days no previous admission in last 30 days   Equipment Needed After Discharge none   Discharge Disposition home or self-care   Current Health   Anticipated Changes Related to Illness none   Living Environment   Transportation Available car   Self-Care   Equipment Currently Used at Home cane, straight;walker, rolling;grab bar;commode;oxygen;respiratory supplies  (Uses Elevated commode, Bipap, nebulizer and oxygen 3 liters/nc at night provided by Kalee FERRER)         Problem: Urine Elimination, Impaired (Adult)  Intervention: Support/Optimize Psychosocial Response to Urinary Dysfunction    01/03/17 0804   Coping Strategies   Supportive Measures active listening utilized       Intervention: Promote Effective Urine Elimination/Improve Continence    01/03/17 1652   Hygiene Care Assistance   Perineal Care perianal area cleansed         Goal: Identify Related Risk Factors and Signs and Symptoms    01/03/17 1652   Urine Elimination, Impaired   Signs and Symptoms (Urine Elimination Impaired) acute pain (abdomen, pelvis, back)       Goal: Effective Urinary Elimination  Outcome: Ongoing (interventions implemented as appropriate)    01/03/17 1652   Urine Elimination, Impaired (Adult)   Effective Urinary Elimination making  progress toward outcome       Goal: Effective Containment of Urine  Outcome: Ongoing (interventions implemented as appropriate)    17 165   Urine Elimination, Impaired (Adult)   Effective Containment of Urine making progress toward outcome       Goal: Reduced Incontinence Episodes  Outcome: Ongoing (interventions implemented as appropriate)    17 165   Urine Elimination, Impaired (Adult)   Reduced Incontinence Episodes making progress toward outcome         Problem: Health Knowledge, Opportunity for Enhanced (Adult,NICU,[place],Obstetrics,Pediatric)  Intervention: Enhance Health Knowledge    17 0804   Coping Strategies   Supportive Measures active listening utilized         Goal: Identify Related Risk Factors and Signs and Symptoms  Outcome: Ongoing (interventions implemented as appropriate)    17   Health Knowledge, Opportunity for Enhanced   Signs and Symptoms (Health Knowledge Enhance) knowledge/skill deficiency       Goal: Knowledgeable about Health Subject/Topic  Outcome: Ongoing (interventions implemented as appropriate)    17   Health Knowledge, Opportunity for Enhanced (Adult,NICU,,Obstetrics,Pediatric)   Knowledgeable about Health Subject/Topic making progress toward outcome

## 2017-01-03 NOTE — H&P
[facility] MEDICINE HISTORY AND PHYSICAL    PCP: Jil Henson MD  Specialists:    Chief Complaint: flank pain, dysuria and fever    Subjective     History of Present Illness  Mr. Felder is a 55 yo M with a hx of past renal stones, uncontrolled hypertension, insulin dependent diabetes, ankylosing spondylitis, and IBS who presents to the ED with 3 days of bilateral flank pain, associated with some nausea, fever and dysuria. Patient states that his symptoms started last Thursday, he however tried to hold on seeking any care for a while, however yesterday pain became unbearable, and he developed fever hence his presentation to the ED. On arrival here patient was hypertensive, w/u included a CT abd/pelvis that was revealing for a 4 x 5 x 8 mm left ureteral stone with hydronephrosis. On our evaluation patient endorsed mild nausea dysuria, flank pain, no vomiting, CP ,palpitations. Patient  Admitted to hospitalist team for further management.     Review of Systems   Otherwise complete ROS is negative except as mentioned in the HPI.    Past Medical History:   Past Medical History   Diagnosis Date   • Acid reflux    • Ankylosing spondylitis    • Arthritis    • Asthma    • Diabetes mellitus    • Diabetes mellitus    • Hypertension    • IBS (irritable bowel syndrome)    • Kidney stones    • Kyphosis    • Scoliosis    • Seasonal allergies    • Ventricular tachycardia        Past Surgical History:  Past Surgical History   Procedure Laterality Date   • Appendectomy         Family History: Ankylosing spondylitis     Social History:  reports that he has never smoked. He does not have any smokeless tobacco history on file. He reports that he drinks alcohol. He reports that he does not use illicit drugs.    Medications:  Prescriptions Prior to Admission   Medication Sig Dispense Refill Last Dose   • adalimumab (HUMIRA) 40 MG/0.8ML Prefilled Syringe Kit injection Inject 40 mg under the skin 1 (One) Time.   Unknown at  Unknown time   • albuterol (PROVENTIL HFA;VENTOLIN HFA) 108 (90 BASE) MCG/ACT inhaler Inhale 2 puffs Every 4 (Four) Hours As Needed for wheezing.   More than a month at Unknown time   • aspirin 325 MG tablet Take 325 mg by mouth Daily.   1/2/2017 at Unknown time   • Canagliflozin (INVOKANA PO) Take 100 mg by mouth Daily.   1/1/2017 at Unknown time   • cetirizine (zyrTEC) 10 MG tablet Take 10 mg by mouth Daily.   1/1/2017 at Unknown time   • dicyclomine (BENTYL) 20 MG tablet Take 20 mg by mouth Daily As Needed.      • furosemide (LASIX) 40 MG tablet Take 40 mg by mouth Daily.   1/1/2017 at Unknown time   • HYDROcodone-acetaminophen (NORCO)  MG per tablet Take 1 tablet by mouth 2 (Two) Times a Day.   1/1/2017 at Unknown time   • insulin aspart (novoLOG FLEXPEN) 100 UNIT/ML solution pen-injector sc pen Inject 30 Units under the skin 3 (Three) Times a Day With Meals.   1/1/2017 at Unknown time   • insulin aspart prot-insulin aspart (novoLOG 70/30) (70-30) 100 UNIT/ML injection Inject 60 Units under the skin 2 (Two) Times a Day With Meals.   1/1/2017 at Unknown time   • isosorbide mononitrate (IMDUR) 30 MG 24 hr tablet Take 30 mg by mouth Daily.   1/1/2017 at Unknown time   • Liraglutide (VICTOZA) 18 MG/3ML solution pen-injector Inject 1.8 mg under the skin.   1/1/2017 at Unknown time   • lisinopril (PRINIVIL,ZESTRIL) 2.5 MG tablet Take 2.5 mg by mouth Daily.   1/1/2017   • metFORMIN (GLUCOPHAGE) 1000 MG tablet Take 1,000 mg by mouth 2 (Two) Times a Day With Meals.   1/1/2017   • metoprolol succinate XL (TOPROL-XL) 100 MG 24 hr tablet Take 100 mg by mouth 2 (Two) Times a Day.   1/1/2017 at Unknown time   • omeprazole (priLOSEC) 20 MG capsule Take 20 mg by mouth Daily.   1/1/2017   • potassium chloride (KLOR-CON) 20 MEQ packet Take 20 mEq by mouth Daily.   1/1/2017   • raNITIdine (ZANTAC) 150 MG tablet Take 150 mg by mouth 2 (Two) Times a Day.   1/1/2017     Allergies   Allergen Reactions   • Sulfa Antibiotics   "      Objective    Physical Exam:   Vital Signs:   Visit Vitals   • /74 (BP Location: Left arm, Patient Position: Lying)   • Pulse 88   • Temp 98.1 °F (36.7 °C) (Oral)   • Resp 20   • Ht 60\" (152.4 cm)   • Wt 255 lb 1.3 oz (116 kg)   • SpO2 92%   • BMI 49.82 kg/m2     Physical Exam  Temp:  [98 °F (36.7 °C)-98.2 °F (36.8 °C)] 98.1 °F (36.7 °C)  Heart Rate:  [] 88  Resp:  [16-20] 20  BP: (126-169)/() 126/74     Constitutional: no acute distress, awake, alert  Eyes: PERRLA, sclerae anicteric, no conjunctival injection  Neck: supple, no thyromegaly, trachea midline  Respiratory: Clear to auscultation bilaterally, nonlabored respirations   Cardiovascular: RRR, no murmurs, rubs, or gallops, palpable pedal pulses bilaterally  Gastrointestinal: positive bowel sounds, soft, nontender, nondistended  Musculoskeletal: No bilateral ankle edema, no clubbing or cyanosis to bilateral lower extremities  Psychiatric: oriented x 3, appropriate affect, cooperative  Neurologic: Strength symmetric in all extremities, Cranial Nerves grossly intact to confrontation     Results Reviewed:  I have personally reviewed current lab, radiology, and data and agree.    Results from last 7 days  Lab Units 01/03/17  0247   WBC 10*3/mm3 8.37   HEMOGLOBIN g/dL 14.8   PLATELETS 10*3/mm3 294       Results from last 7 days  Lab Units 01/03/17  0247   SODIUM mmol/L 141   POTASSIUM mmol/L 3.7   TOTAL CO2 mmol/L 34.0*   CREATININE mg/dL 0.80   GLUCOSE mg/dL 169*   CALCIUM mg/dL 9.8     Lower thorax: There is mild subsegmental atelectasis and/or scarring in the    lung bases.     ABDOMEN:  Liver: Unremarkable. No obvious liver masses appreciated on this    non-contrast exam.  Gallbladder and bile ducts: Cholelithiasis is noted. No obvious gallbladder    wall thickening or pericholecytistic inflammatory changes. No significant    biliary ductal dilatation appreciated.  Pancreas: Unremarkable. No ductal dilation.  Spleen: Unremarkable. No " splenomegaly.  Adrenals: Unremarkable. No adrenal nodules or masses identified.  Kidneys and ureters: There is moderate left-sided hydronephrosis and    hydroureter. A stone measuring 4 x 5 x 8 mm is visualized in the left distal    ureter, just proximal to the ureterovesicular junction. There are small    bilateral intrarenal stones. Mild bilateral perinephric stranding. No    right-sided hydronephrosis. Small bilateral renal cysts.     PELVIS:  Bladder: Unremarkable. No stones.  Reproductive: Unremarkable as visualized.  Appendix: The appendix is not definitively visualized and is likely    surgically absent.     ABDOMEN and PELVIS:  Stomach and bowel: No evidence of bowel obstruction. No significant bowel    wall thickening. Colonic diverticulosis is noted, without associated    inflammatory changes to suggest diverticulitis.  Peritoneum: Unremarkable. No significant fluid collection. No free air.  Abdominal/pelvic wall: Fat containing umbilical hernia.  Lymph nodes: No significant lymph node enlargement.  Vasculature: Atherosclerotic calcifications are noted. No aortic aneurysm.  Bones: Degenerative changes of the spine and bilateral hips. Increased    thoracic kyphosis. No definite acute fracture.     IMPRESSION:  1. Left distal ureteral stone with associated moderate hydronephrosis and    hydroureter.    2. Bilateral intrarenal stones.  3. Colonic diverticulosis without evidence of diverticulitis.      Assessment/Plan   Assessment & Plan  Principal Problem:    Ureterolithiasis  Active Problems:    Essential hypertension    IBS (irritable bowel syndrome)    Diabetes    56 yoM p/w abdominal pain admitted with ureterolithiasis    # Left ureterolithiasis with hydronephrosis and hydroureter  # Diverticulosis- stable  # Hypertension, uncontrolled  # Insulin dependent diabetes, unsure of control    Plan  - ivf NS 100ml/hr  - pain control  - urology consult  - adjust BP rx  - Check A1C    I discussed the patients  findings and my recommendations with patient and he is agreeable    Kaiden Cummings MD   01/03/17   1:11 PM

## 2017-01-03 NOTE — PROGRESS NOTES
Discharge Planning Assessment  [facility]     Patient Name: Thomas Felder  MRN: [id number]  Today's Date: 1/3/2017    Admit Date: 1/2/2017          Discharge Needs Assessment       01/03/17 1509    Living Environment    Lives With spouse    Living Arrangements mobile home    Provides Primary Care For no one    Primary Care Provided By spouse/significant other    Able to Return to Prior Living Arrangements yes    Discharge Needs Assessment    Concerns To Be Addressed adjustment to diagnosis/illness concerns;basic needs concerns;no discharge needs identified    Readmission Within The Last 30 Days no previous admission in last 30 days    Anticipated Changes Related to Illness none    Equipment Currently Used at Home cane, straight;walker, rolling;grab bar;commode;oxygen;respiratory supplies   Uses Elevated commode, Bipap, nebulizer and oxygen 3 liters/nc at night provided by Kalee FERRER    Equipment Needed After Discharge none    Transportation Available car    Discharge Disposition home or self-care            Discharge Plan       01/03/17 1511    Case Management/Social Work Plan    Plan To home    Patient/Family In Agreement With Plan yes    Additional Comments Anticipates discharge to home. No known discharge planning needs identified at this time.         Discharge Placement     No information found                Demographic Summary       01/03/17 1507    Referral Information    Admission Type observation    Referral Source admission list    Record Reviewed medical record    Contact Information    Permission Granted to Share Information With     Primary Care Physician Information    Name Sharon Henson            Functional Status       01/03/17 1507    Functional Status Current    Ambulation 0-->independent    Transferring 0-->independent    Toileting 0-->independent    Bathing 0-->independent    Dressing 0-->independent    Eating 0-->independent    Communication 0-->understands/communicates  without difficulty    Change in Functional Status Since Onset of Current Illness/Injury no    Functional Status Prior    Ambulation 1-->assistive equipment    Transferring 1-->assistive equipment    Toileting 0-->independent    Bathing 0-->independent    Dressing 0-->independent    Eating 0-->independent    Communication 0-->understands/communicates without difficulty    IADL    Medications independent    Meal Preparation assistive person   Wife does the homemaking chores    Housekeeping assistive person    Laundry assistive person    Shopping independent    Oral Care independent    Activity Tolerance    Usual Activity Tolerance good    Current Activity Tolerance good    Employment/Financial    Employment/Finance Comments Tells me he has [employer] with no known concerns obtaining medications            Psychosocial     None            Abuse/Neglect     None            Legal     None            Substance Abuse     None            Patient Forms     None          Ne Servin RN

## 2017-01-03 NOTE — ED PROVIDER NOTES
Subjective   Patient is a 57 y.o. male presenting with abdominal pain.   Abdominal Pain   Pain quality: aching, sharp and stabbing    Pain radiates to:  Does not radiate  Onset quality:  Gradual  Duration:  3 days  Timing:  Constant  Progression:  Waxing and waning  Chronicity:  New  Context: recent illness    Context: not alcohol use, not awakening from sleep, not diet changes, not eating, not laxative use, not medication withdrawal, not previous surgeries and not recent travel    Relieved by:  Nothing  Worsened by:  Nothing  Ineffective treatments:  None tried  Associated symptoms: anorexia, chills, dysuria, fever, hematuria, nausea and vomiting    Associated symptoms: no belching, no chest pain, no constipation, no cough, no diarrhea, no fatigue, no flatus, no hematemesis, no hematochezia, no melena and no shortness of breath      56-year-old male presents with a three-day history of bilateral flank pain dysuria and hematuria.  Was seen in [facility], told to go to [place] emergency department for evaluation of probable kidney stone.  Patient states decided not to go to the ER, has had fevers chills and increasing abdominal pain throughout the weekend.  Presents today for evaluation.  He does have a history of hypertension kidney stones and diabetes, insulin-dependent.    Primary care provider is Jil Henson.  Allergic to sulfa antibiotics, medication list reviewed      Review of Systems   Constitutional: Positive for chills and fever. Negative for fatigue.   Respiratory: Negative for cough and shortness of breath.    Cardiovascular: Negative for chest pain.   Gastrointestinal: Positive for abdominal pain, anorexia, nausea and vomiting. Negative for constipation, diarrhea, flatus, hematemesis, hematochezia and melena.   Genitourinary: Positive for dysuria and hematuria.   All other systems reviewed and are negative.      Past Medical History   Diagnosis Date   • Acid reflux    • Ankylosing spondylitis     • Arthritis    • Asthma    • Diabetes mellitus    • Diabetes mellitus    • Hypertension    • IBS (irritable bowel syndrome)    • Kidney stones    • Kyphosis    • Scoliosis    • Seasonal allergies    • Ventricular tachycardia        Allergies   Allergen Reactions   • Sulfa Antibiotics Swelling     Generalized swelling        Past Surgical History   Procedure Laterality Date   • Appendectomy     • Cystoscopy ureteroscopy Left 1/6/2017     Procedure: LEFT URETEROSCOPY WITH  LASER LITHOTRIPSY, AND  STONE BASKETING AND LEFT URETERAL STENT;  Surgeon: Alex Stanton MD;  Location: [facility];  Service:        History reviewed. No pertinent family history.    Social History     Social History   • Marital status:      Spouse name: N/A   • Number of children: N/A   • Years of education: N/A     Social History Main Topics   • Smoking status: Never Smoker   • Smokeless tobacco: None   • Alcohol use Yes      Comment: OCCASSIONAL   • Drug use: No   • Sexual activity: Not Asked     Other Topics Concern   • None     Social History Narrative           Objective   Physical Exam   Constitutional: He is oriented to person, place, and time. He appears well-developed and well-nourished. No distress.   HENT:   Head: Normocephalic and atraumatic.   Right Ear: External ear normal.   Left Ear: External ear normal.   Nose: Nose normal.   Mouth/Throat: Oropharynx is clear and moist. No oropharyngeal exudate.   Eyes: Conjunctivae and EOM are normal. Pupils are equal, round, and reactive to light. Right eye exhibits no discharge. Left eye exhibits no discharge. No scleral icterus.   Neck: Normal range of motion. Neck supple. No JVD present. No tracheal deviation present. No thyromegaly present.   Cardiovascular: Normal rate, regular rhythm, normal heart sounds and intact distal pulses.  Exam reveals no gallop and no friction rub.    No murmur heard.  Pulmonary/Chest: Effort normal and breath sounds normal. No stridor. No respiratory  distress. He has no wheezes. He has no rales. He exhibits no tenderness.   Abdominal: Soft. Bowel sounds are normal. He exhibits no distension and no mass. There is no tenderness. There is no rebound and no guarding. No hernia.   MIld left flank tenderness to palpation   Musculoskeletal: Normal range of motion. He exhibits no edema, tenderness or deformity.   Lymphadenopathy:     He has no cervical adenopathy.   Neurological: He is alert and oriented to person, place, and time. He has normal reflexes. He displays normal reflexes. No cranial nerve deficit. He exhibits normal muscle tone. Coordination normal.   Skin: Skin is warm and dry. No rash noted. He is not diaphoretic. No erythema. No pallor.   Psychiatric: He has a normal mood and affect. His behavior is normal. Judgment and thought content normal.   Nursing note and vitals reviewed.      Procedures          Course of Care      Lab Results (last 24 hours)     ** No results found for the last 24 hours. **          Note: In addition to lab results from this visit, the labs listed above may include labs taken at another facility or during a different encounter within the last 24 hours. Please correlate lab times with ED admission and discharge times for further clarification of the services performed during this visit.    FL C Arm During Surgery   Final Result   Fluoroscopic time not recorded.       D:  01/06/2017   E:  01/06/2017                This report was finalized on 1/6/2017 3:50 PM by Dr. Vitor Porter MD.          XR Abdomen KUB   Final Result   Punctate calcifications projecting over both renal shadows.   Normal bowel gas pattern.   Calcifications throughout the lumbar spine suggesting ankylosing   spondylitis.       DICTATED:     01/04/2017   EDITED:          01/04/2017       This report was finalized on 1/6/2017 10:26 AM by Dr. Chan Montero MD.          CT Abdomen Pelvis Without Contrast   ED Interpretation   1.  Left distal ureteral stone with  associated moderate hydronephrosis and    hydroureter.     2.  Bilateral intrarenal stones.   3.  Colonic diverticulosis without evidence of diverticulitis.         THIS DOCUMENT HAS BEEN ELECTRONICALLY SIGNED BY GALLO ALVARENGA MD      Final Result   Abnormal   1.  Left distal ureteral stone with associated moderate hydronephrosis and    hydroureter.     2.  Bilateral intrarenal stones.   3.  Colonic diverticulosis without evidence of diverticulitis.         THIS DOCUMENT HAS BEEN ELECTRONICALLY SIGNED BY GALLO ALVARENGA MD          Vitals:    01/06/17 1634 01/06/17 2212 01/07/17 0148 01/07/17 0516   BP:  127/82 143/83 135/82   BP Location: Left arm Right arm Right arm Right arm   Patient Position: Lying Lying Lying Lying   Pulse: 78 64 70 73   Resp: 16 16 18 16   Temp: 98 °F (36.7 °C) 98.3 °F (36.8 °C) 99.4 °F (37.4 °C) 99.1 °F (37.3 °C)   TempSrc: Oral Oral Oral Oral   SpO2:    97%   Weight:       Height:           Medications   sodium chloride 0.9 % infusion (0 mL/hr Intravenous Stopped 1/4/17 0140)   sodium chloride 0.9 % infusion (100 mL/hr Intravenous New Bag 1/5/17 0538)   ondansetron (ZOFRAN) injection 4 mg (4 mg Intravenous Given 1/6/17 1230)   ketorolac (TORADOL) injection 15 mg (15 mg Intravenous Given 1/3/17 0419)   tamsulosin (FLOMAX) 24 hr capsule 0.4 mg (0.4 mg Oral Given 1/3/17 0422)   HYDROcodone-acetaminophen (NORCO) 5-325 MG per tablet 1 tablet (1 tablet Oral Given 1/3/17 0421)   HYDROcodone-acetaminophen (NORCO) 5-325 MG per tablet 1 tablet (1 tablet Oral Given 1/3/17 2136)   ceFAZolin in dextrose (ANCEF) IVPB solution 2 g (2 g Intravenous Given 1/6/17 1150)       ECG/EMG Results (last 24 hours)     ** No results found for the last 24 hours. **            ED Course  ED Course   Comment By Time   CT abdomen pelvis without contrast per radiology:    Kidneys and ureters: There is moderate left-sided hydronephrosis and    hydroureter. A stone measuring 4 x 5 x 8 mm is visualized in the left distal    ureter,  just proximal to the ureterovesicular junction. There are small    bilateral intrarenal stones. Mild bilateral perinephric stranding. No    right-sided hydronephrosis. Small bilateral renal cysts.  IMPRESSION:  1. Left distal ureteral stone with associated moderate hydronephrosis and    hydroureter.    2. Bilateral intrarenal stones.  3. Colonic diverticulosis without evidence of diverticulitis. Anthony Matson PA-C 01/03 0327   D?W Dr. Gonzalez, accepts pt to hospitalist service Anthony Matson PA-C 01/03 0341                  MDM    Final diagnoses:   Ureterolithiasis   Renal colic on left side   Hydronephrosis, left   Hydroureter, left   Insulin dependent diabetes mellitus   Essential hypertension            Anthony Matson PA-C  01/03/17 0406       Anthony Matson PA-C  01/30/17 0214       Anthony Matson PA-C  02/05/17 0311

## 2017-01-03 NOTE — CONSULTS
Urology Consult Note    Thomas Felder  LOS: 1      ASSESSMENT:    56 y.o. male with a several day Hx of L flank pain due to a distal L ureteral calculus.  He has a Hx of stones which he has passed spontaneously.  He has undergone 1 previous basket stone extraction as well.  CT reveals bilateral small renal stones as well.     DISCUSSION/RECOMMENDATIONS/PLAN:  Will observe for trial of spontaneous passage and medical expulsive therapy.  Check KUB in AM.  Operative intervention prn    HPI:  Thomas Felder is a 56 y.o. male who was admitted 1/2/2017  for Ureterolithiasis [N20.1]. Patient was referred by Dr Anjali Aguirre for evaluation of L renal colic. Patient has had chills, flank pain on left, nausea and dysuria for 4 days.  Patient has significant prior urologic history of urolithiasis and no risk factors for cancer. Patient denies history of  trauma and  cancer. Patient has seen a urologist in the past. He has been followed by Dr Scot Gonzalez for prostatism..    Past Medical History   Diagnosis Date   • Acid reflux    • Ankylosing spondylitis    • Arthritis    • Asthma    • Diabetes mellitus    • Diabetes mellitus    • Hypertension    • IBS (irritable bowel syndrome)    • Kidney stones    • Kyphosis    • Scoliosis    • Seasonal allergies    • Ventricular tachycardia      Past Surgical History   Procedure Laterality Date   • Appendectomy       Prescriptions Prior to Admission   Medication Sig Dispense Refill Last Dose   • adalimumab (HUMIRA) 40 MG/0.8ML Prefilled Syringe Kit injection Inject 40 mg under the skin 1 (One) Time.   Unknown at Unknown time   • albuterol (PROVENTIL HFA;VENTOLIN HFA) 108 (90 BASE) MCG/ACT inhaler Inhale 2 puffs Every 4 (Four) Hours As Needed for wheezing.   More than a month at Unknown time   • aspirin 325 MG tablet Take 325 mg by mouth Daily.   1/2/2017 at Unknown time   • Canagliflozin (INVOKANA PO) Take 100 mg by mouth Daily.   1/1/2017 at Unknown time   • cetirizine  (zyrTEC) 10 MG tablet Take 10 mg by mouth Daily.   1/1/2017 at Unknown time   • dicyclomine (BENTYL) 20 MG tablet Take 20 mg by mouth Daily As Needed.      • furosemide (LASIX) 40 MG tablet Take 40 mg by mouth Daily.   1/1/2017 at Unknown time   • HYDROcodone-acetaminophen (NORCO)  MG per tablet Take 1 tablet by mouth 2 (Two) Times a Day.   1/1/2017 at Unknown time   • insulin aspart (novoLOG FLEXPEN) 100 UNIT/ML solution pen-injector sc pen Inject 30 Units under the skin 3 (Three) Times a Day With Meals.   1/1/2017 at Unknown time   • insulin aspart prot-insulin aspart (novoLOG 70/30) (70-30) 100 UNIT/ML injection Inject 60 Units under the skin 2 (Two) Times a Day With Meals.   1/1/2017 at Unknown time   • isosorbide mononitrate (IMDUR) 30 MG 24 hr tablet Take 30 mg by mouth Daily.   1/1/2017 at Unknown time   • Liraglutide (VICTOZA) 18 MG/3ML solution pen-injector Inject 1.8 mg under the skin.   1/1/2017 at Unknown time   • lisinopril (PRINIVIL,ZESTRIL) 2.5 MG tablet Take 2.5 mg by mouth Daily.   1/1/2017   • metFORMIN (GLUCOPHAGE) 1000 MG tablet Take 1,000 mg by mouth 2 (Two) Times a Day With Meals.   1/1/2017   • metoprolol succinate XL (TOPROL-XL) 100 MG 24 hr tablet Take 100 mg by mouth 2 (Two) Times a Day.   1/1/2017 at Unknown time   • omeprazole (priLOSEC) 20 MG capsule Take 20 mg by mouth Daily.   1/1/2017   • potassium chloride (KLOR-CON) 20 MEQ packet Take 20 mEq by mouth Daily.   1/1/2017   • raNITIdine (ZANTAC) 150 MG tablet Take 150 mg by mouth 2 (Two) Times a Day.   1/1/2017     Allergies   Allergen Reactions   • Sulfa Antibiotics      History reviewed. No pertinent family history.  Social History     Social History   • Marital status:      Spouse name: N/A   • Number of children: N/A   • Years of education: N/A     Occupational History   • Not on file.     Social History Main Topics   • Smoking status: Never Smoker   • Smokeless tobacco: Not on file   • Alcohol use Yes      Comment:  "OCCASSIONAL   • Drug use: No   • Sexual activity: Not on file     Other Topics Concern   • Not on file     Social History Narrative         Review of Systems  Pertinent items are noted in HPI.    Objective     Blood pressure 102/70, pulse 78, temperature 98 °F (36.7 °C), temperature source Oral, resp. rate 18, height 60\" (152.4 cm), weight 255 lb 1.3 oz (116 kg), SpO2 96 %.  Visit Vitals   • /70   • Pulse 78   • Temp 98 °F (36.7 °C) (Oral)   • Resp 18   • Ht 60\" (152.4 cm)   • Wt 255 lb 1.3 oz (116 kg)   • SpO2 96%   • BMI 49.82 kg/m2       Physicial Exam    General: WDWN male with obvious spinal abnormality and kyphosis, in NAD  Neck: Supple with no masses or adenopathy  Back: No CVAT, spine linear and non-tender   Abdomen: Soft and non-tender with no masses, organomegaly or guarding.  Extremities: FROM with no edema, pulses full  Neuro: Nonfocal        [id number] mm distal L ureteral calculus and mild L hydro.  2 small stones seen in R and L kidney and small stone seen near R UPJCT Abdomen: 4x5x8 mm distal L ureteral calculus and mild L hydro.  2 small stones seen in R and L kidney and small stone seen near R UPJ    Labs  Urine Microscopy:   Results from last 7 days  Lab Units 01/03/17  0157   RBC UA /HPF 7-12*   WBC UA /HPF 0-2*   , Urinalysis:   Results from last 7 days  Lab Units 01/03/17  0157   PH, URINE  7.0   PROTEIN UA  Negative   GLUCOSE UA  >=1000 mg/dL (3+)*   KETONES UA  Negative   , BMP:   Results from last 7 days  Lab Units 01/03/17  0247   SODIUM mmol/L 141   POTASSIUM mmol/L 3.7   CALCIUM mg/dL 9.8   CHLORIDE mmol/L 102   TOTAL CO2 mmol/L 34.0*   BUN mg/dL 9   CREATININE mg/dL 0.80   ALBUMIN g/dL 4.40   BILIRUBIN mg/dL 0.5   ALK PHOS U/L 115*    and CBC:   Results from last 7 days  Lab Units 01/03/17  0247   WBC 10*3/mm3 8.37   RBC 10*6/mm3 4.90   HEMOGLOBIN g/dL 14.8   HEMATOCRIT % 44.4   PLATELETS 10*3/mm3 294       Alex Stanton MD - 1/3/2017, NOW@  "

## 2017-01-04 ENCOUNTER — APPOINTMENT (OUTPATIENT)
Dept: GENERAL RADIOLOGY | Facility: HOSPITAL | Age: 57
End: 2017-01-04

## 2017-01-04 LAB
ANION GAP SERPL CALCULATED.3IONS-SCNC: 5 MMOL/L (ref 3–11)
BUN BLD-MCNC: 18 MG/DL (ref 9–23)
BUN/CREAT SERPL: 16.4 (ref 7–25)
CALCIUM SPEC-SCNC: 9.4 MG/DL (ref 8.7–10.4)
CHLORIDE SERPL-SCNC: 102 MMOL/L (ref 99–109)
CO2 SERPL-SCNC: 32 MMOL/L (ref 20–31)
CREAT BLD-MCNC: 1.1 MG/DL (ref 0.6–1.3)
GFR SERPL CREATININE-BSD FRML MDRD: 69 ML/MIN/1.73
GLUCOSE BLD-MCNC: 206 MG/DL (ref 70–100)
GLUCOSE BLDC GLUCOMTR-MCNC: 169 MG/DL (ref 70–130)
GLUCOSE BLDC GLUCOMTR-MCNC: 191 MG/DL (ref 70–130)
GLUCOSE BLDC GLUCOMTR-MCNC: 195 MG/DL (ref 70–130)
GLUCOSE BLDC GLUCOMTR-MCNC: 201 MG/DL (ref 70–130)
POTASSIUM BLD-SCNC: 4.5 MMOL/L (ref 3.5–5.5)
SODIUM BLD-SCNC: 139 MMOL/L (ref 132–146)

## 2017-01-04 PROCEDURE — 25010000002 ONDANSETRON PER 1 MG: Performed by: INTERNAL MEDICINE

## 2017-01-04 PROCEDURE — 80048 BASIC METABOLIC PNL TOTAL CA: CPT | Performed by: INTERNAL MEDICINE

## 2017-01-04 PROCEDURE — 74000 HC ABDOMEN KUB: CPT

## 2017-01-04 PROCEDURE — 96361 HYDRATE IV INFUSION ADD-ON: CPT

## 2017-01-04 PROCEDURE — 96374 THER/PROPH/DIAG INJ IV PUSH: CPT

## 2017-01-04 PROCEDURE — 82962 GLUCOSE BLOOD TEST: CPT

## 2017-01-04 PROCEDURE — 63710000001 INSULIN DETEMIR PER 5 UNITS: Performed by: INTERNAL MEDICINE

## 2017-01-04 PROCEDURE — G0378 HOSPITAL OBSERVATION PER HR: HCPCS

## 2017-01-04 PROCEDURE — 99225 PR SBSQ OBSERVATION CARE/DAY 25 MINUTES: CPT | Performed by: INTERNAL MEDICINE

## 2017-01-04 RX ORDER — ONDANSETRON 2 MG/ML
4 INJECTION INTRAMUSCULAR; INTRAVENOUS EVERY 6 HOURS PRN
Status: DISCONTINUED | OUTPATIENT
Start: 2017-01-04 | End: 2017-01-06

## 2017-01-04 RX ADMIN — TAMSULOSIN HYDROCHLORIDE 0.4 MG: 0.4 CAPSULE ORAL at 21:53

## 2017-01-04 RX ADMIN — ASPIRIN 325 MG ORAL TABLET 325 MG: 325 PILL ORAL at 08:17

## 2017-01-04 RX ADMIN — FAMOTIDINE 20 MG: 20 TABLET ORAL at 17:56

## 2017-01-04 RX ADMIN — ONDANSETRON 4 MG: 2 INJECTION INTRAMUSCULAR; INTRAVENOUS at 08:46

## 2017-01-04 RX ADMIN — INSULIN LISPRO 3 UNITS: 100 INJECTION, SOLUTION INTRAVENOUS; SUBCUTANEOUS at 17:56

## 2017-01-04 RX ADMIN — HYDROCODONE BITARTRATE AND ACETAMINOPHEN 1 TABLET: 10; 325 TABLET ORAL at 17:56

## 2017-01-04 RX ADMIN — SODIUM CHLORIDE 100 ML/HR: 9 INJECTION, SOLUTION INTRAVENOUS at 17:59

## 2017-01-04 RX ADMIN — HYDROCODONE BITARTRATE AND ACETAMINOPHEN 1 TABLET: 10; 325 TABLET ORAL at 08:17

## 2017-01-04 RX ADMIN — FAMOTIDINE 20 MG: 20 TABLET ORAL at 08:17

## 2017-01-04 RX ADMIN — INSULIN LISPRO 3 UNITS: 100 INJECTION, SOLUTION INTRAVENOUS; SUBCUTANEOUS at 08:17

## 2017-01-04 RX ADMIN — METOPROLOL SUCCINATE 100 MG: 100 TABLET, EXTENDED RELEASE ORAL at 21:53

## 2017-01-04 RX ADMIN — INSULIN DETEMIR 15 UNITS: 100 INJECTION, SOLUTION SUBCUTANEOUS at 21:54

## 2017-01-04 RX ADMIN — SODIUM CHLORIDE 100 ML/HR: 9 INJECTION, SOLUTION INTRAVENOUS at 06:21

## 2017-01-04 RX ADMIN — ACETAMINOPHEN 650 MG: 325 TABLET, FILM COATED ORAL at 00:39

## 2017-01-04 RX ADMIN — INSULIN LISPRO 5 UNITS: 100 INJECTION, SOLUTION INTRAVENOUS; SUBCUTANEOUS at 21:53

## 2017-01-04 RX ADMIN — ISOSORBIDE MONONITRATE 30 MG: 30 TABLET, EXTENDED RELEASE ORAL at 08:17

## 2017-01-04 RX ADMIN — INSULIN LISPRO 3 UNITS: 100 INJECTION, SOLUTION INTRAVENOUS; SUBCUTANEOUS at 11:57

## 2017-01-04 RX ADMIN — CETIRIZINE HYDROCHLORIDE 10 MG: 10 TABLET, FILM COATED ORAL at 08:17

## 2017-01-04 RX ADMIN — ACETAMINOPHEN 650 MG: 325 TABLET, FILM COATED ORAL at 11:57

## 2017-01-04 RX ADMIN — METOPROLOL SUCCINATE 100 MG: 100 TABLET, EXTENDED RELEASE ORAL at 08:17

## 2017-01-04 NOTE — PLAN OF CARE
Problem: Patient Care Overview (Adult)  Goal: Plan of Care Review  Outcome: Ongoing (interventions implemented as appropriate)    17 0425 17 1626   Patient Care Overview   Progress --  improving --    Outcome Evaluation   Outcome Summary/Follow up Plan --  --  Vss. Urine continuing to be strained. Pain well controlled. Does c/o nausea and chills/dizziness. MD notified. Zofran given for nausea. Denies further needs.    Coping/Psychosocial Response Interventions   Plan Of Care Reviewed With patient --  --        Goal: Adult Individualization and Mutuality  Outcome: Ongoing (interventions implemented as appropriate)  Goal: Discharge Needs Assessment  Outcome: Ongoing (interventions implemented as appropriate)    Problem: Urine Elimination, Impaired (Adult)  Goal: Identify Related Risk Factors and Signs and Symptoms  Outcome: Ongoing (interventions implemented as appropriate)  Goal: Effective Urinary Elimination  Outcome: Ongoing (interventions implemented as appropriate)  Goal: Effective Containment of Urine  Outcome: Ongoing (interventions implemented as appropriate)  Goal: Reduced Incontinence Episodes  Outcome: Ongoing (interventions implemented as appropriate)    Problem: Health Knowledge, Opportunity for Enhanced (Adult,NICU,[place],Obstetrics,Pediatric)  Goal: Identify Related Risk Factors and Signs and Symptoms  Outcome: Ongoing (interventions implemented as appropriate)  Goal: Knowledgeable about Health Subject/Topic  Outcome: Ongoing (interventions implemented as appropriate)

## 2017-01-04 NOTE — PLAN OF CARE
Problem: Patient Care Overview (Adult)  Goal: Plan of Care Review  Outcome: Ongoing (interventions implemented as appropriate)    17 0425   Patient Care Overview   Progress --  improving   Outcome Evaluation   Outcome Summary/Follow up Plan --  strain all urine. VSS. no overnight events. KUB this morning to evaluate need for further intervention vs letting stone pass spontaneously.    Coping/Psychosocial Response Interventions   Plan Of Care Reviewed With patient --        Goal: Adult Individualization and Mutuality  Outcome: Ongoing (interventions implemented as appropriate)  Goal: Discharge Needs Assessment  Outcome: Ongoing (interventions implemented as appropriate)    Problem: Urine Elimination, Impaired (Adult)  Goal: Identify Related Risk Factors and Signs and Symptoms  Outcome: Ongoing (interventions implemented as appropriate)  Goal: Effective Urinary Elimination  Outcome: Ongoing (interventions implemented as appropriate)  Goal: Effective Containment of Urine  Outcome: Ongoing (interventions implemented as appropriate)  Goal: Reduced Incontinence Episodes  Outcome: Ongoing (interventions implemented as appropriate)    Problem: Health Knowledge, Opportunity for Enhanced (Adult,NICU,[place],Obstetrics,Pediatric)  Goal: Identify Related Risk Factors and Signs and Symptoms  Outcome: Ongoing (interventions implemented as appropriate)  Goal: Knowledgeable about Health Subject/Topic  Outcome: Ongoing (interventions implemented as appropriate)

## 2017-01-04 NOTE — PROGRESS NOTES
Adult Nutrition  Assessment/PES    Patient Name:  Thomas Felder  YOB: 1960  MRN: [id number]  Admit Date:  1/2/2017    Assessment Date:  1/4/2017        Reason for Assessment       01/04/17 0926    Reason for Assessment    Time Spent (min) 5            Data Eval/ Notes       01/04/17 0924     Notes    Reason RN Request   for guest tray for his wife    Patient Concerns --   Per nsg., pt. wants to make sure his wife receives a guest tray for lunch.    Plan --   Contacted the  and she stated she would have a food and nutrtion rep. come to the room to receive payment for guest tray.                            Problem/Interventions:                    Nutrition Intervention       01/04/17 0926    Nutrition Intervention    RD/Tech Action Follow Tx progress              Education/Evaluation       01/04/17 0927    Monitor/Evaluation    Monitor Per protocol            Electronically signed by:  Iza Sullivan RD  01/04/17 9:27 AM

## 2017-01-04 NOTE — PROGRESS NOTES
" Progress Note          Subjective: Still having L flank pain with some nausea    Objective:  Visit Vitals   • /61 (BP Location: Right arm, Patient Position: Lying)   • Pulse 78   • Temp 98.8 °F (37.1 °C) (Oral)   • Resp 18   • Ht 60\" (152.4 cm)   • Wt 255 lb 1.3 oz (116 kg)   • SpO2 92%   • BMI 49.82 kg/m2       PE: abd soft and nontender      I/O last 3 completed shifts:  In: 1851 [I.V.:1851]  Out: 685 [Urine:685]     KUB - likely stone distal L ureter    Lab Results (last 24 hours)     Procedure Component Value Units Date/Time    POC Glucose Fingerstick [[id number]]  (Abnormal) Collected:  01/03/17 1733    Specimen:  Blood Updated:  01/03/17 1736     Glucose 144 (H) mg/dL     Narrative:       Meter: [id number] : [id number] Julian Edwards    POC Glucose Fingerstick [[id number]]  (Abnormal) Collected:  01/03/17 2059    Specimen:  Blood Updated:  01/03/17 2109     Glucose 191 (H) mg/dL     Narrative:       Meter: [id number] : [id number] Major Jake    POC Glucose Fingerstick [[id number]]  (Abnormal) Collected:  01/04/17 0723    Specimen:  Blood Updated:  01/04/17 0724     Glucose 169 (H) mg/dL     Narrative:       Meter: [id number] : [id number] Julian Edwards    Basic Metabolic Panel [[id number]]  (Abnormal) Collected:  01/04/17 0516    Specimen:  Blood Updated:  01/04/17 0731     Glucose 206 (H) mg/dL      BUN 18 mg/dL      Creatinine 1.10 mg/dL      Sodium 139 mmol/L      Potassium 4.5 mmol/L      Chloride 102 mmol/L      CO2 32.0 (H) mmol/L      Calcium 9.4 mg/dL      eGFR Non African Amer 69 mL/min/1.73      BUN/Creatinine Ratio 16.4      Anion Gap 5.0 mmol/L     Narrative:       National Kid[employer] Guidelines    Stage                           Description                             GFR                      1                               Normal or High                          90+  2                               Mild decrease                            60-89  3                               " Moderate decrease                   30-59  4                               Severe decrease                       15-29  5                               Kidney failure                             <15    POC Glucose Fingerstick [[id number]]  (Abnormal) Collected:  01/04/17 1103    Specimen:  Blood Updated:  01/04/17 1106     Glucose 195 (H) mg/dL     Narrative:       Meter: [id number] : [id number] Julian Jerry            Assessment: L ureteral stone with colic      Plan:Options rediscussed - patient would like to proceed with stone extration in AM. He is aware of potential risks and complications and desires to proceed.       Alex Stanton MD - 1/4/2017, 12:44 PM

## 2017-01-04 NOTE — PROGRESS NOTES
"      HOSPITALIST DAILY PROGRESS NOTE    Chief Complaint: flank pain, dysuria, fever    Subjective   SUBJECTIVE/OVERNIGHT EVENTS   No acute events overnight, patient states that he is feeling better, endorses some nausea    Review of Systems:  Gen-no fevers, no chills  CV-no chest pain, no palpitations  Resp-no cough, no dyspnea  GI-+ nausea no V/D, no abd pain    Objective   OBJECTIVE   I have reviewed the vital signs.  Visit Vitals   • /61 (BP Location: Right arm, Patient Position: Lying)   • Pulse 78   • Temp 98.8 °F (37.1 °C) (Oral)   • Resp 18   • Ht 60\" (152.4 cm)   • Wt 255 lb 1.3 oz (116 kg)   • SpO2 92%   • BMI 49.82 kg/m2       Physical Exam:  Gen-no acute distress, comfortable  CV-RRR, S1 S2 normal, no m/r/g  Resp-CTAB, no wheezes  Abd-soft, NT, ND, +BS  Ext-no edema  Neuro-A&Ox3, no focal deficits  Psych-appropriate mood    Results:  I have reviewed the labs,  radiology results, and diagnostic studies.      Results from last 7 days  Lab Units 01/03/17  0247   WBC 10*3/mm3 8.37   HEMOGLOBIN g/dL 14.8   HEMATOCRIT % 44.4   PLATELETS 10*3/mm3 294       Results from last 7 days  Lab Units 01/04/17  0516   SODIUM mmol/L 139   POTASSIUM mmol/L 4.5   CHLORIDE mmol/L 102   TOTAL CO2 mmol/L 32.0*   BUN mg/dL 18   CREATININE mg/dL 1.10   GLUCOSE mg/dL 206*   CALCIUM mg/dL 9.4       Radiology Results:  Imaging Results (last 24 hours)     Procedure Component Value Units Date/Time    XR Abdomen KUB [[id number]]      Updated:  01/04/17 0936        I have reviewed the medications.    Assessment/Plan   ASSESSMENT/PLAN    Principal Problem:    Ureterolithiasis  Active Problems:    Essential hypertension    IBS (irritable bowel syndrome)    Diabetes    56 yoM p/w abdominal pain admitted with ureterolithiasis     # Left ureterolithiasis with hydronephrosis and hydroureter  # Diverticulosis- stable  # Hypertension, uncontrolled  # Insulin dependent diabetes, poor control, A1C 9.1%     Plan  - urology consulted, rec " watchful waiting for now, get KUB  - ivf NS 100ml/hr  - pain control  - adjust BP rx  - Check A1C    Dispo: pending w/u per urology    Kaiden Cummings MD  01/04/17  12:43 PM

## 2017-01-05 ENCOUNTER — ANESTHESIA EVENT (OUTPATIENT)
Dept: PERIOP | Facility: HOSPITAL | Age: 57
End: 2017-01-05

## 2017-01-05 ENCOUNTER — ANESTHESIA (OUTPATIENT)
Dept: PERIOP | Facility: HOSPITAL | Age: 57
End: 2017-01-05

## 2017-01-05 LAB
GLUCOSE BLDC GLUCOMTR-MCNC: 162 MG/DL (ref 70–130)
GLUCOSE BLDC GLUCOMTR-MCNC: 163 MG/DL (ref 70–130)
GLUCOSE BLDC GLUCOMTR-MCNC: 211 MG/DL (ref 70–130)
GLUCOSE BLDC GLUCOMTR-MCNC: 213 MG/DL (ref 70–130)

## 2017-01-05 PROCEDURE — 96376 TX/PRO/DX INJ SAME DRUG ADON: CPT

## 2017-01-05 PROCEDURE — 63710000001 DIPHENHYDRAMINE PER 50 MG: Performed by: INTERNAL MEDICINE

## 2017-01-05 PROCEDURE — 82962 GLUCOSE BLOOD TEST: CPT

## 2017-01-05 PROCEDURE — 63710000001 INSULIN DETEMIR PER 5 UNITS: Performed by: INTERNAL MEDICINE

## 2017-01-05 PROCEDURE — 99224 PR SBSQ OBSERVATION CARE/DAY 15 MINUTES: CPT | Performed by: INTERNAL MEDICINE

## 2017-01-05 PROCEDURE — G0378 HOSPITAL OBSERVATION PER HR: HCPCS

## 2017-01-05 PROCEDURE — 25010000002 ONDANSETRON PER 1 MG: Performed by: INTERNAL MEDICINE

## 2017-01-05 RX ORDER — DIPHENHYDRAMINE HCL 25 MG
25 CAPSULE ORAL NIGHTLY PRN
Status: DISCONTINUED | OUTPATIENT
Start: 2017-01-05 | End: 2017-01-07 | Stop reason: HOSPADM

## 2017-01-05 RX ADMIN — METOPROLOL SUCCINATE 100 MG: 100 TABLET, EXTENDED RELEASE ORAL at 08:40

## 2017-01-05 RX ADMIN — TAMSULOSIN HYDROCHLORIDE 0.4 MG: 0.4 CAPSULE ORAL at 22:08

## 2017-01-05 RX ADMIN — DIPHENHYDRAMINE HYDROCHLORIDE 25 MG: 25 CAPSULE ORAL at 23:51

## 2017-01-05 RX ADMIN — HYDROCODONE BITARTRATE AND ACETAMINOPHEN 1 TABLET: 10; 325 TABLET ORAL at 08:40

## 2017-01-05 RX ADMIN — FAMOTIDINE 20 MG: 20 TABLET ORAL at 18:09

## 2017-01-05 RX ADMIN — INSULIN LISPRO 5 UNITS: 100 INJECTION, SOLUTION INTRAVENOUS; SUBCUTANEOUS at 18:09

## 2017-01-05 RX ADMIN — ACETAMINOPHEN 650 MG: 325 TABLET, FILM COATED ORAL at 13:28

## 2017-01-05 RX ADMIN — METOPROLOL SUCCINATE 100 MG: 100 TABLET, EXTENDED RELEASE ORAL at 22:08

## 2017-01-05 RX ADMIN — SODIUM CHLORIDE 100 ML/HR: 9 INJECTION, SOLUTION INTRAVENOUS at 05:38

## 2017-01-05 RX ADMIN — ACETAMINOPHEN 650 MG: 325 TABLET, FILM COATED ORAL at 01:54

## 2017-01-05 RX ADMIN — INSULIN LISPRO 5 UNITS: 100 INJECTION, SOLUTION INTRAVENOUS; SUBCUTANEOUS at 22:09

## 2017-01-05 RX ADMIN — HYDROCODONE BITARTRATE AND ACETAMINOPHEN 1 TABLET: 10; 325 TABLET ORAL at 18:09

## 2017-01-05 RX ADMIN — ONDANSETRON 4 MG: 2 INJECTION INTRAMUSCULAR; INTRAVENOUS at 01:54

## 2017-01-05 RX ADMIN — INSULIN DETEMIR 15 UNITS: 100 INJECTION, SOLUTION SUBCUTANEOUS at 22:09

## 2017-01-05 NOTE — PLAN OF CARE
Problem: Patient Care Overview (Adult)  Goal: Plan of Care Review    01/05/17 1638   Patient Care Overview   Progress no change   Outcome Evaluation   Outcome Summary/Follow up Plan Pain well controlled withj meds. VSS. Plan for uteroscopy tomorrow.    Coping/Psychosocial Response Interventions   Plan Of Care Reviewed With patient

## 2017-01-05 NOTE — PLAN OF CARE
Problem: Patient Care Overview (Adult)  Goal: Plan of Care Review  Outcome: Ongoing (interventions implemented as appropriate)    17 0425 17 0436   Patient Care Overview   Progress improving --  --    Outcome Evaluation   Outcome Summary/Follow up Plan --  --  No overnight events. VSS. strain all urine. plan for uteroscopy with stone extraction and possible stent today with Dr. Stanton. Denies further needs.   Coping/Psychosocial Response Interventions   Plan Of Care Reviewed With --  patient --        Goal: Adult Individualization and Mutuality  Outcome: Ongoing (interventions implemented as appropriate)  Goal: Discharge Needs Assessment  Outcome: Ongoing (interventions implemented as appropriate)    Problem: Urine Elimination, Impaired (Adult)  Goal: Identify Related Risk Factors and Signs and Symptoms  Outcome: Ongoing (interventions implemented as appropriate)  Goal: Effective Urinary Elimination  Outcome: Ongoing (interventions implemented as appropriate)  Goal: Effective Containment of Urine  Outcome: Ongoing (interventions implemented as appropriate)    Problem: Health Knowledge, Opportunity for Enhanced (Adult,NICU,[place],Obstetrics,Pediatric)  Goal: Identify Related Risk Factors and Signs and Symptoms  Outcome: Ongoing (interventions implemented as appropriate)  Goal: Knowledgeable about Health Subject/Topic  Outcome: Ongoing (interventions implemented as appropriate)

## 2017-01-05 NOTE — PROGRESS NOTES
"      HOSPITALIST DAILY PROGRESS NOTE    Chief Complaint: flank pain, fever and dysuria    Subjective   SUBJECTIVE/OVERNIGHT EVENTS   No acute events overnight, patient comfortable, endorses some soreness, spouse at bedside    Review of Systems:  Gen-no fevers, no chills  CV-no chest pain, no palpitations  Resp-no cough, no dyspnea  GI-no N/V/D, no abd pain    Objective   OBJECTIVE   I have reviewed the vital signs.  Visit Vitals   • /78   • Pulse 76   • Temp 98.3 °F (36.8 °C) (Oral)   • Resp 18   • Ht 60\" (152.4 cm)   • Wt 255 lb 1.3 oz (116 kg)   • SpO2 90%   • BMI 49.82 kg/m2       Physical Exam:  Gen-no acute distress, comfortable  CV-RRR, S1 S2 normal, no m/r/g  Resp-CTAB, no wheezes  Abd-soft, NT, ND, +BS  Ext-no edema  Neuro-A&Ox3, no focal deficits  Psych-appropriate mood    Results:  I have reviewed the labs, radiology results, and diagnostic studies.      Results from last 7 days  Lab Units 01/03/17  0247   WBC 10*3/mm3 8.37   HEMOGLOBIN g/dL 14.8   HEMATOCRIT % 44.4   PLATELETS 10*3/mm3 294       Results from last 7 days  Lab Units 01/04/17  0516   SODIUM mmol/L 139   POTASSIUM mmol/L 4.5   CHLORIDE mmol/L 102   TOTAL CO2 mmol/L 32.0*   BUN mg/dL 18   CREATININE mg/dL 1.10   GLUCOSE mg/dL 206*   CALCIUM mg/dL 9.4       Radiology Results:  Imaging Results (last 24 hours)     Procedure Component Value Units Date/Time    XR Abdomen KUB [[id number]] Collected:  01/04/17 1538     Updated:  01/04/17 1538    Narrative:          EXAMINATION: XR ABDOMEN KUB - 01/04/2017     INDICATION: STONE; N20.1-Calculus of ureter; N23-Unspecified renal  colic; N13.30-Unspecified hydronephrosis; N13.4-Hydroureter; E11.9-Type  2 diabetes mellitus without complications; Z79.4-Long term (current) use  of insulin; I10-Essential (primary) hypertension.      COMPARISON: None.     FINDINGS: Gas is present in multiple loops of large and small bowel.  There are no abnormally dilated loops of large or small bowel. The lung  bases are " clear. Degenerative changes are present throughout the lumbar  spine. A few small densities project over the right renal shadow. Small  densities also project over the left renal shadow.       Impression:       Punctate calcifications projecting over both renal shadows.  Normal bowel gas pattern.  Calcifications throughout the lumbar spine suggesting ankylosing  spondylitis.     DICTATED:     01/04/2017  EDITED:          01/04/2017             I have reviewed the medications.    Assessment/Plan   ASSESSMENT/PLAN    Principal Problem:    Ureterolithiasis  Active Problems:    Essential hypertension    IBS (irritable bowel syndrome)    Diabetes    56 yoM p/w abdominal pain admitted with ureterolithiasis      # Left ureterolithiasis with hydronephrosis and hydroureter  # Diverticulosis- stable  # Hypertension, controlled  # Insulin dependent diabetes, poor control, A1C 9.1%      Plan  - urology consulted, stone extraction today  - ivf NS 100ml/hr  - pain control  - adjust BP rx     Dispo: pending w/u per urology. Anticipate d/c in 1-2 days    Kaiden Cummings MD  01/05/17  11:42 AM

## 2017-01-06 ENCOUNTER — APPOINTMENT (OUTPATIENT)
Dept: GENERAL RADIOLOGY | Facility: HOSPITAL | Age: 57
End: 2017-01-06

## 2017-01-06 LAB
GLUCOSE BLDC GLUCOMTR-MCNC: 161 MG/DL (ref 70–130)
GLUCOSE BLDC GLUCOMTR-MCNC: 168 MG/DL (ref 70–130)
GLUCOSE BLDC GLUCOMTR-MCNC: 183 MG/DL (ref 70–130)
GLUCOSE BLDC GLUCOMTR-MCNC: 192 MG/DL (ref 70–130)

## 2017-01-06 PROCEDURE — C2617 STENT, NON-COR, TEM W/O DEL: HCPCS | Performed by: UROLOGY

## 2017-01-06 PROCEDURE — C1758 CATHETER, URETERAL: HCPCS | Performed by: UROLOGY

## 2017-01-06 PROCEDURE — 25010000002 PROPOFOL 10 MG/ML EMULSION: Performed by: NURSE ANESTHETIST, CERTIFIED REGISTERED

## 2017-01-06 PROCEDURE — 82962 GLUCOSE BLOOD TEST: CPT

## 2017-01-06 PROCEDURE — G0378 HOSPITAL OBSERVATION PER HR: HCPCS

## 2017-01-06 PROCEDURE — 25010000003 CEFAZOLIN IN DEXTROSE 2-4 GM/100ML-% SOLUTION: Performed by: UROLOGY

## 2017-01-06 PROCEDURE — 63710000001 INSULIN DETEMIR PER 5 UNITS: Performed by: INTERNAL MEDICINE

## 2017-01-06 PROCEDURE — 25010000002 ONDANSETRON PER 1 MG: Performed by: PHYSICIAN ASSISTANT

## 2017-01-06 PROCEDURE — C1769 GUIDE WIRE: HCPCS | Performed by: UROLOGY

## 2017-01-06 PROCEDURE — 25010000002 MIDAZOLAM PER 1 MG: Performed by: NURSE ANESTHETIST, CERTIFIED REGISTERED

## 2017-01-06 PROCEDURE — 25010000002 PHENYLEPHRINE PER 1 ML: Performed by: NURSE ANESTHETIST, CERTIFIED REGISTERED

## 2017-01-06 PROCEDURE — 99225 PR SBSQ OBSERVATION CARE/DAY 25 MINUTES: CPT | Performed by: NURSE PRACTITIONER

## 2017-01-06 PROCEDURE — 76000 FLUOROSCOPY <1 HR PHYS/QHP: CPT

## 2017-01-06 DEVICE — URETERAL STENT
Type: IMPLANTABLE DEVICE | Status: FUNCTIONAL
Brand: PERCUFLEX™ PLUS

## 2017-01-06 RX ORDER — SODIUM CHLORIDE 0.9 % (FLUSH) 0.9 %
1-10 SYRINGE (ML) INJECTION AS NEEDED
Status: DISCONTINUED | OUTPATIENT
Start: 2017-01-06 | End: 2017-01-06 | Stop reason: HOSPADM

## 2017-01-06 RX ORDER — KETAMINE HCL IN 0.9 % NACL 50 MG/5 ML
SYRINGE (ML) INTRAVENOUS AS NEEDED
Status: DISCONTINUED | OUTPATIENT
Start: 2017-01-06 | End: 2017-01-06 | Stop reason: SURG

## 2017-01-06 RX ORDER — SODIUM CHLORIDE 9 MG/ML
100 INJECTION, SOLUTION INTRAVENOUS CONTINUOUS
Status: DISCONTINUED | OUTPATIENT
Start: 2017-01-06 | End: 2017-01-07 | Stop reason: HOSPADM

## 2017-01-06 RX ORDER — MAGNESIUM HYDROXIDE 1200 MG/15ML
LIQUID ORAL AS NEEDED
Status: DISCONTINUED | OUTPATIENT
Start: 2017-01-06 | End: 2017-01-06 | Stop reason: HOSPADM

## 2017-01-06 RX ORDER — SODIUM CHLORIDE, SODIUM LACTATE, POTASSIUM CHLORIDE, CALCIUM CHLORIDE 600; 310; 30; 20 MG/100ML; MG/100ML; MG/100ML; MG/100ML
INJECTION, SOLUTION INTRAVENOUS CONTINUOUS PRN
Status: DISCONTINUED | OUTPATIENT
Start: 2017-01-06 | End: 2017-01-06 | Stop reason: SURG

## 2017-01-06 RX ORDER — SODIUM CHLORIDE 9 MG/ML
9 INJECTION, SOLUTION INTRAVENOUS CONTINUOUS PRN
Status: DISCONTINUED | OUTPATIENT
Start: 2017-01-06 | End: 2017-01-06 | Stop reason: HOSPADM

## 2017-01-06 RX ORDER — LIDOCAINE HYDROCHLORIDE 10 MG/ML
0.5 INJECTION, SOLUTION EPIDURAL; INFILTRATION; INTRACAUDAL; PERINEURAL ONCE AS NEEDED
Status: DISCONTINUED | OUTPATIENT
Start: 2017-01-06 | End: 2017-01-06 | Stop reason: HOSPADM

## 2017-01-06 RX ORDER — ONDANSETRON 2 MG/ML
4 INJECTION INTRAMUSCULAR; INTRAVENOUS ONCE AS NEEDED
Status: DISCONTINUED | OUTPATIENT
Start: 2017-01-06 | End: 2017-01-06 | Stop reason: HOSPADM

## 2017-01-06 RX ORDER — GENTAMICIN SULFATE 60 MG/50ML
120 INJECTION, SOLUTION INTRAVENOUS ONCE
Status: DISCONTINUED | OUTPATIENT
Start: 2017-01-06 | End: 2017-01-06

## 2017-01-06 RX ORDER — LIDOCAINE HYDROCHLORIDE 10 MG/ML
INJECTION, SOLUTION INFILTRATION; PERINEURAL AS NEEDED
Status: DISCONTINUED | OUTPATIENT
Start: 2017-01-06 | End: 2017-01-06 | Stop reason: SURG

## 2017-01-06 RX ORDER — CEFAZOLIN SODIUM 2 G/100ML
2 INJECTION, SOLUTION INTRAVENOUS ONCE
Status: COMPLETED | OUTPATIENT
Start: 2017-01-06 | End: 2017-01-06

## 2017-01-06 RX ORDER — PROPOFOL 10 MG/ML
VIAL (ML) INTRAVENOUS AS NEEDED
Status: DISCONTINUED | OUTPATIENT
Start: 2017-01-06 | End: 2017-01-06 | Stop reason: SURG

## 2017-01-06 RX ORDER — MIDAZOLAM HYDROCHLORIDE 1 MG/ML
INJECTION INTRAMUSCULAR; INTRAVENOUS AS NEEDED
Status: DISCONTINUED | OUTPATIENT
Start: 2017-01-06 | End: 2017-01-06 | Stop reason: SURG

## 2017-01-06 RX ORDER — FENTANYL CITRATE 50 UG/ML
50 INJECTION, SOLUTION INTRAMUSCULAR; INTRAVENOUS
Status: DISCONTINUED | OUTPATIENT
Start: 2017-01-06 | End: 2017-01-06 | Stop reason: HOSPADM

## 2017-01-06 RX ORDER — FAMOTIDINE 20 MG/1
20 TABLET, FILM COATED ORAL ONCE
Status: DISCONTINUED | OUTPATIENT
Start: 2017-01-06 | End: 2017-01-06 | Stop reason: HOSPADM

## 2017-01-06 RX ORDER — LEVOFLOXACIN 500 MG/1
500 TABLET, FILM COATED ORAL
Status: DISCONTINUED | OUTPATIENT
Start: 2017-01-06 | End: 2017-01-07 | Stop reason: HOSPADM

## 2017-01-06 RX ADMIN — PHENYLEPHRINE HYDROCHLORIDE 100 MCG: 10 INJECTION INTRAVENOUS at 11:55

## 2017-01-06 RX ADMIN — TAMSULOSIN HYDROCHLORIDE 0.4 MG: 0.4 CAPSULE ORAL at 21:41

## 2017-01-06 RX ADMIN — METOPROLOL SUCCINATE 100 MG: 100 TABLET, EXTENDED RELEASE ORAL at 21:41

## 2017-01-06 RX ADMIN — PHENYLEPHRINE HYDROCHLORIDE 200 MCG: 10 INJECTION INTRAVENOUS at 12:30

## 2017-01-06 RX ADMIN — LIDOCAINE HYDROCHLORIDE 50 MG: 10 INJECTION, SOLUTION INFILTRATION; PERINEURAL at 11:49

## 2017-01-06 RX ADMIN — FAMOTIDINE 20 MG: 20 TABLET ORAL at 08:06

## 2017-01-06 RX ADMIN — SODIUM CHLORIDE, POTASSIUM CHLORIDE, SODIUM LACTATE AND CALCIUM CHLORIDE: 600; 310; 30; 20 INJECTION, SOLUTION INTRAVENOUS at 11:40

## 2017-01-06 RX ADMIN — ACETAMINOPHEN 650 MG: 325 TABLET, FILM COATED ORAL at 19:14

## 2017-01-06 RX ADMIN — HYDROCODONE BITARTRATE AND ACETAMINOPHEN 1 TABLET: 10; 325 TABLET ORAL at 08:06

## 2017-01-06 RX ADMIN — METOPROLOL SUCCINATE 100 MG: 100 TABLET, EXTENDED RELEASE ORAL at 08:06

## 2017-01-06 RX ADMIN — CETIRIZINE HYDROCHLORIDE 10 MG: 10 TABLET, FILM COATED ORAL at 08:06

## 2017-01-06 RX ADMIN — PHENYLEPHRINE HYDROCHLORIDE 200 MCG: 10 INJECTION INTRAVENOUS at 12:15

## 2017-01-06 RX ADMIN — LEVOFLOXACIN 500 MG: 500 TABLET, FILM COATED ORAL at 17:00

## 2017-01-06 RX ADMIN — INSULIN LISPRO 3 UNITS: 100 INJECTION, SOLUTION INTRAVENOUS; SUBCUTANEOUS at 17:21

## 2017-01-06 RX ADMIN — SODIUM CHLORIDE 100 ML/HR: 9 INJECTION, SOLUTION INTRAVENOUS at 17:00

## 2017-01-06 RX ADMIN — HYDROCODONE BITARTRATE AND ACETAMINOPHEN 1 TABLET: 10; 325 TABLET ORAL at 17:01

## 2017-01-06 RX ADMIN — CEFAZOLIN SODIUM 2 G: 2 INJECTION, SOLUTION INTRAVENOUS at 11:50

## 2017-01-06 RX ADMIN — PROPOFOL 200 MG: 10 INJECTION, EMULSION INTRAVENOUS at 11:49

## 2017-01-06 RX ADMIN — MIDAZOLAM HYDROCHLORIDE 2 MG: 1 INJECTION, SOLUTION INTRAMUSCULAR; INTRAVENOUS at 11:43

## 2017-01-06 RX ADMIN — PHENYLEPHRINE HYDROCHLORIDE 200 MCG: 10 INJECTION INTRAVENOUS at 12:00

## 2017-01-06 RX ADMIN — KETAMINE HCL-NACL SOLN PREF SY 50 MG/5ML-0.9% (10MG/ML) 10 MG: 10 SOLUTION PREFILLED SYRINGE at 11:46

## 2017-01-06 RX ADMIN — INSULIN DETEMIR 15 UNITS: 100 INJECTION, SOLUTION SUBCUTANEOUS at 21:41

## 2017-01-06 RX ADMIN — ISOSORBIDE MONONITRATE 30 MG: 30 TABLET, EXTENDED RELEASE ORAL at 08:06

## 2017-01-06 RX ADMIN — ONDANSETRON 4 MG: 2 INJECTION INTRAMUSCULAR; INTRAVENOUS at 12:30

## 2017-01-06 NOTE — OP NOTE
DATE OF PROCEDURE:  01/06/2017    PREOPERATIVE DIAGNOSIS: Left ureteral calculus.     POSTOPERATIVE DIAGNOSIS: Left ureteral calculus.     PROCEDURES PERFORMED:  1. Cystoscopy.   2. Left ureteroscopy.   3. Ureteroscopic laser lithotripsy with basket stone extraction.   4. Left ureteral stent placement.   5. Fluoroscopic guidance.     SURGEON: Alex Stanton MD    ANESTHESIA: General with awake induction and laryngoscopic-guided intubation.    COMPLICATIONS: None.     INDICATIONS: This gentleman presents with an obstructing symptomatic distal left ureteral calculus with failure to pass his stone. Plans were made for ureteroscopic laser lithotripsy due to size of stone and failure to pass. The patient has noted prominent thoracic kyphosis having led to anesthetic difficulties in the past. Anesthesia services were consulted preoperatively and plans were made for an awake intubation.     DESCRIPTION OF PROCEDURE: The patient was brought to the operating room where anesthesia was induced as stated above without difficulty. The patient was positioned in the lithotomy position and extremities and all pressure points were padded and protected appropriately. The genitalia were prepped and draped sterilely. A 22-Amharic panendoscope was passed per urethra into the bladder. A systematic exam of the bladder was carried out. The urethra had been instilled with viscous Xylocaine as well. The left ureteral orifice was cannulated with a floppy tip guidewire. Initially this wire would not pass, but ultimately with the use of a Glidewire the wire passed into the renal pelvis under fluoroscopic guidance. A rigid ureteroscope was then introduced per urethra directly into the left ureter. The stone was identified within the distal third ureter and was quite enlarged. Holmium laser fiber was used 365 nm size and laser energy was applied to the stone with excellent fragmentation. Due to the size of the stone considerable fragmentation  was required. Ultimately, a 4 wire helical basket was used to grasp stones and pull these into the bladder re-advancing the scope into the ureter using a filiform tip to the basket. Approximately 4 passes of the basket were made extracting stone fragments. Repeated ureteroscopy then confirmed no visible residual fragments and an intact ureter in the site where the stone had been lodged. The scope was removed. A 4.8 Spanish x 22 cm double-J ureteral stent was then positioned well with a curl in the bladder and renal pelvis and string left exiting the urethral meatus. The patient was awakened and transported to postop recovery in stable condition. He tolerated the procedure well and there were no complications.     FINDINGS: Bladder and prostatic fossa unremarkable. Urethra unremarkable. Left ureter was obstructed with a fairly large stone in the distal left ureter. Significant fragmentation into a large number of fragments was required. The stone was fragmented completely and extracted completely into the bladder. Reinspection of the ureter revealed no ureteral perforation or other abnormality. The stent was well positioned at the conclusion of the procedure.       STEVE Tim  DD: 01/06/2017 13:25:49  DT: 01/06/2017 16:52:05  Voice Rec. ID #[id number]  Voice Original ID #[id number]  Doc ID #[id number]  Rev. #0  cc:

## 2017-01-06 NOTE — ANESTHESIA PROCEDURE NOTES
Airway  Urgency: elective    Airway not difficult    General Information and Staff    Patient location during procedure: OR  Anesthesiologist: LUPE FOSTER  CRNA: JONNY MITCHELL    Indications and Patient Condition  Indications for airway management: airway protection    Preoxygenated: yes  MILS not maintained throughout  Mask difficulty assessment: 1 - vent by mask    Final Airway Details  Final airway type: endotracheal airway      Successful airway: ETT  Cuffed: yes   Successful intubation technique: awake intubation and flexible bronchoscopy  Facilitating devices/methods: OPA  Endotracheal tube insertion site: oral  ETT size: 7.0 mm  Placement verified by: chest auscultation and capnometry   Measured from: lips  ETT to lips (cm): 22  Number of attempts at approach: 1    Additional Comments  Negative epigastric sounds, Breath sound equal bilaterally with symmetric chest rise and fall.  Awake Intubation.  4% Viscous Lidocaine in pre-op.  Ovassapin airway placed.  VC visible on 1st pass.  Bronch in trachea.  ETT over bronchoscope.  + ETCO2 & BBS

## 2017-01-06 NOTE — PLAN OF CARE
Problem: Patient Care Overview (Adult)  Goal: Plan of Care Review  Outcome: Ongoing (interventions implemented as appropriate)    17 0419   Outcome Evaluation   Outcome Summary/Follow up Plan pulled out iv. patient anxious regarding upcoming surgery. pain well controlled   Coping/Psychosocial Response Interventions   Plan Of Care Reviewed With patient       Goal: Adult Individualization and Mutuality  Outcome: Ongoing (interventions implemented as appropriate)  Goal: Discharge Needs Assessment  Outcome: Ongoing (interventions implemented as appropriate)    Problem: Health Knowledge, Opportunity for Enhanced (Adult,NICU,,Obstetrics,Pediatric)  Goal: Identify Related Risk Factors and Signs and Symptoms  Outcome: Ongoing (interventions implemented as appropriate)  Goal: Knowledgeable about Health Subject/Topic  Outcome: Ongoing (interventions implemented as appropriate)

## 2017-01-06 NOTE — PROGRESS NOTES
Continued Stay Note  [facility]     Patient Name: Thomas Felder  MRN: [id number]  Today's Date: 1/6/2017    Admit Date: 1/2/2017          Discharge Plan       01/06/17 0752    Case Management/Social Work Plan    Plan To home    Patient/Family In Agreement With Plan yes    Additional Comments Planning stone extraction. No discharge planning needs identified at this time.               Discharge Codes     None            Ne Servin RN

## 2017-01-06 NOTE — ANESTHESIA PREPROCEDURE EVALUATION
Anesthesia Evaluation     Patient summary reviewed and Nursing notes reviewed    History of anesthetic complications   Airway   Mallampati: IV  TM distance: >3 FB  Neck ROM: limited  difficult intubation highly probable  Dental    (+) edentulous    Pulmonary - normal exam   (+) asthma,   Cardiovascular - normal exam  (+) hypertension,     Neuro/Psych- negative ROS  GI/Hepatic/Renal/Endo    (+)  GERD, chronic renal disease, diabetes mellitus,     Musculoskeletal     Abdominal    Substance History      OB/GYN          Other         Other Comment: Ankylosing spondylitis                        Anesthesia Plan    ASA 4     general   (Awake FOB)  intravenous induction   Anesthetic plan and risks discussed with patient.    Plan discussed with CRNA.

## 2017-01-06 NOTE — PROGRESS NOTES
"      HOSPITALIST DAILY PROGRESS NOTE    Chief Complaint: flank pain, fever and dysuria    Subjective   SUBJECTIVE/OVERNIGHT EVENTS   Patient in PACU recovering. RN reports agitation coming out of anesthesia. Given Versed and placed in restraints. Patient currently sleeping soundly.    Review of Systems:  Unable to assess 2/2 to sedation    Objective   OBJECTIVE   I have reviewed the vital signs.  Visit Vitals   • BP 93/61   • Pulse 74   • Temp 98 °F (36.7 °C) (Temporal Artery )   • Resp 16   • Ht 60\" (152.4 cm)   • Wt 255 lb 1.3 oz (116 kg)   • SpO2 97%   • BMI 49.82 kg/m2       Physical Exam:  Gen-no acute distress, comfortable  CV-RRR, S1 S2 normal, no m/r/g  Resp-CTAB, no wheezes  Abd-soft, NT, ND, +BS  Ext-no edema  Neuro-sedated    Results:  I have reviewed the labs, radiology results, and diagnostic studies.      Results from last 7 days  Lab Units 01/03/17  0247   WBC 10*3/mm3 8.37   HEMOGLOBIN g/dL 14.8   HEMATOCRIT % 44.4   PLATELETS 10*3/mm3 294       Results from last 7 days  Lab Units 01/04/17  0516   SODIUM mmol/L 139   POTASSIUM mmol/L 4.5   CHLORIDE mmol/L 102   TOTAL CO2 mmol/L 32.0*   BUN mg/dL 18   CREATININE mg/dL 1.10   GLUCOSE mg/dL 206*   CALCIUM mg/dL 9.4       Radiology Results:  Imaging Results (last 24 hours)     Procedure Component Value Units Date/Time    XR Abdomen KUB [[id number]] Collected:  01/04/17 1538     Updated:  01/04/17 1538    Narrative:          EXAMINATION: XR ABDOMEN KUB - 01/04/2017     INDICATION: STONE; N20.1-Calculus of ureter; N23-Unspecified renal  colic; N13.30-Unspecified hydronephrosis; N13.4-Hydroureter; E11.9-Type  2 diabetes mellitus without complications; Z79.4-Long term (current) use  of insulin; I10-Essential (primary) hypertension.      COMPARISON: None.     FINDINGS: Gas is present in multiple loops of large and small bowel.  There are no abnormally dilated loops of large or small bowel. The lung  bases are clear. Degenerative changes are present throughout " the lumbar  spine. A few small densities project over the right renal shadow. Small  densities also project over the left renal shadow.       Impression:       Punctate calcifications projecting over both renal shadows.  Normal bowel gas pattern.  Calcifications throughout the lumbar spine suggesting ankylosing  spondylitis.     DICTATED:     01/04/2017  EDITED:          01/04/2017             I have reviewed the medications.    Assessment/Plan   ASSESSMENT/PLAN    Principal Problem:    Ureterolithiasis  Active Problems:    Essential hypertension    IBS (irritable bowel syndrome)    Diabetes    56 yoM p/w abdominal pain admitted with ureterolithiasis      # Left ureterolithiasis with hydronephrosis and hydroureter   # Diverticulosis- stable  # Hypertension, controlled  # Insulin dependent diabetes, poor control, A1C 9.1%      Plan  - Left ureteroscopy/ laser lithotripsy with stone basketing and left ureteral stent today  - difficult intubation and recovery requiring additional sedation in recovery  - transfer to tele after pacu to monitor overnight   - ivf NS 100ml/hr  - pain control  - monitor BP     Dispo: Plan home tomorrow am if doing ok    SERGIO Swain  01/06/17  2:14 PM

## 2017-01-06 NOTE — PLAN OF CARE
Problem: Perioperative Period (Adult)  Goal: Signs and Symptoms of Listed Potential Problems Will be Absent or Manageable (Perioperative Period)  Outcome: Ongoing (interventions implemented as appropriate)    01/06/17 1019   Perioperative Period   Problems Assessed (Perioperative Period) pain   Problems Present (Perioperative Period) none

## 2017-01-06 NOTE — BRIEF OP NOTE
CYSTOSCOPY URETEROSCOPY  Procedure Note    Thomas Felder  1/2/2017 - 1/6/2017    Pre-op Diagnosis:   L ureteral calculus    Post-op Diagnosis:     same  Procedure/CPT® Codes:      Procedure(s):  LEFT URETEROSCOPY WITH  LASER LITHOTRIPSY, AND  STONE BASKETING AND LEFT URETERAL STENT    Surgeon(s):  Alex Stanton MD    Anesthesia: Choice    Staff:   Circulator: Miriam Jamison RN  Laser Staff: Obdulia Julio RN  Radiology Technologist: Buddy Kilgore, RT  Scrub Person: Joanne Jesus    Estimated Blood Loss: * No values recorded between 1/6/2017 11:39 AM and 1/6/2017 12:53 PM *    Specimens:                none      Drains:    internal 4.8 fr x 22 cm L ureteral stent       Findings: large distal L ureteral calculus    Complications: None      Alex Stanton MD     Date: 1/6/2017  Time: 1:06 PM

## 2017-01-06 NOTE — ANESTHESIA POSTPROCEDURE EVALUATION
Patient: Thomas Felder    Procedure Summary     Date Anesthesia Start Anesthesia Stop Room / Location    01/06/17 1140  [facility] / [facility]       Procedure Diagnosis Surgeon Provider    LEFT URETEROSCOPY WITH  LASER LITHOTRIPSY, AND  STONE BASKETING AND LEFT URETERAL STENT (Left ) No diagnosis on file. MD Mejia Mccarty MD          Anesthesia Type: general  Last vitals  BP      Temp      Pulse     Resp      SpO2        Post Anesthesia Care and Evaluation    Patient location during evaluation: PACU  Patient participation: complete - patient participated  Level of consciousness: agitated and combative  Pain score: 0  Pain management: adequate  Airway patency: patent  Anesthetic complications: No anesthetic complications  Cardiovascular status: hemodynamically stable and acceptable  Respiratory status: nonlabored ventilation, acceptable and nasal cannula  Hydration status: acceptable

## 2017-01-07 VITALS
DIASTOLIC BLOOD PRESSURE: 82 MMHG | TEMPERATURE: 99.1 F | HEIGHT: 60 IN | HEART RATE: 73 BPM | SYSTOLIC BLOOD PRESSURE: 135 MMHG | BODY MASS INDEX: 50.08 KG/M2 | OXYGEN SATURATION: 97 % | WEIGHT: 255.08 LBS | RESPIRATION RATE: 16 BRPM

## 2017-01-07 LAB — GLUCOSE BLDC GLUCOMTR-MCNC: 148 MG/DL (ref 70–130)

## 2017-01-07 PROCEDURE — 82962 GLUCOSE BLOOD TEST: CPT

## 2017-01-07 PROCEDURE — 99217 PR OBSERVATION CARE DISCHARGE MANAGEMENT: CPT | Performed by: INTERNAL MEDICINE

## 2017-01-07 PROCEDURE — G0378 HOSPITAL OBSERVATION PER HR: HCPCS

## 2017-01-07 RX ORDER — LEVOFLOXACIN 500 MG/1
500 TABLET, FILM COATED ORAL
Qty: 3 TABLET | Refills: 0 | Status: SHIPPED | OUTPATIENT
Start: 2017-01-07 | End: 2017-01-10

## 2017-01-07 RX ORDER — OXYCODONE AND ACETAMINOPHEN 7.5; 325 MG/1; MG/1
1 TABLET ORAL EVERY 4 HOURS PRN
Refills: 0
Start: 2017-01-07

## 2017-01-07 RX ADMIN — ACETAMINOPHEN 650 MG: 325 TABLET, FILM COATED ORAL at 02:10

## 2017-01-07 RX ADMIN — HYDROCODONE BITARTRATE AND ACETAMINOPHEN 1 TABLET: 10; 325 TABLET ORAL at 08:22

## 2017-01-07 RX ADMIN — METOPROLOL SUCCINATE 100 MG: 100 TABLET, EXTENDED RELEASE ORAL at 08:22

## 2017-01-07 RX ADMIN — CETIRIZINE HYDROCHLORIDE 10 MG: 10 TABLET, FILM COATED ORAL at 08:22

## 2017-01-07 RX ADMIN — ISOSORBIDE MONONITRATE 30 MG: 30 TABLET, EXTENDED RELEASE ORAL at 08:22

## 2017-01-07 RX ADMIN — ASPIRIN 325 MG ORAL TABLET 325 MG: 325 PILL ORAL at 08:22

## 2017-01-07 NOTE — PLAN OF CARE
Problem: Urine Elimination, Impaired (Adult)  Goal: Identify Related Risk Factors and Signs and Symptoms  Outcome: Ongoing (interventions implemented as appropriate)    01/07/17 0053   Urine Elimination, Impaired   Urine Elimination, Impaired: Related Risk Factors anatomic obstruction;treatment related;urological disorder;procedure/surgery related   Signs and Symptoms (Urine Elimination Impaired) acute pain (abdomen, pelvis, back)

## 2017-01-07 NOTE — PLAN OF CARE
Problem: Patient Care Overview (Adult)  Goal: Plan of Care Review  Outcome: Outcome(s) achieved Date Met:  17  Goal: Adult Individualization and Mutuality  Outcome: Outcome(s) achieved Date Met:  17  Goal: Discharge Needs Assessment  Outcome: Outcome(s) achieved Date Met:  17    Problem: Urine Elimination, Impaired (Adult)  Goal: Identify Related Risk Factors and Signs and Symptoms  Outcome: Outcome(s) achieved Date Met:  17  Goal: Effective Urinary Elimination  Outcome: Outcome(s) achieved Date Met:  17  Goal: Effective Containment of Urine  Outcome: Outcome(s) achieved Date Met:  17  Goal: Reduced Incontinence Episodes  Outcome: Outcome(s) achieved Date Met:  17    Problem: Health Knowledge, Opportunity for Enhanced (Adult,NICU,[place],Obstetrics,Pediatric)  Goal: Identify Related Risk Factors and Signs and Symptoms  Outcome: Outcome(s) achieved Date Met:  17  Goal: Knowledgeable about Health Subject/Topic  Outcome: Outcome(s) achieved Date Met:  17

## 2017-01-07 NOTE — DISCHARGE SUMMARY
HOSPITAL MEDICINE DISCHARGE SUMMARY    Date of Admission: 1/2/2017  Date of Discharge:  1/7/2017    Discharge Diagnoses:  Principal Problem:    Ureterolithiasis  Active Problems:    Essential hypertension    IBS (irritable bowel syndrome)    Diabetes      Presenting Problem/History of Present Illness  Ureterolithiasis [N20.1]  Patient is a 56 y.o. male presented with flank pain and dysuria.      Discharge Day HPI: Patient up in chair. Doing much better. Still having dysuria, but urinating okay. Has no other complaints.    Hospital Course: 56 year old male admitted with left obstructing renal stone with left hydronephrosis and hydroureter. Patient was placed on IV antibiotics on arrival. Urology was consulted who took patient for left ureteroscopy with lithotripsy and left ureteral stent placement. The patient tolerated the procedure well. I discussed the case with Dr. Stanton after the procedure and he recommended discharging the patient on PO levaquin x 3 days upon discharge. The patient was a difficult airway per anesthesia and because of this they recommended continued observation overnight on a telemetry bed. He had no difficulty from this and will be discharged on home on PO antibiotics, pain medications, and outpatient follow up.      Procedures Performed  Procedure(s):  LEFT URETEROSCOPY WITH  LASER LITHOTRIPSY, AND  STONE BASKETING AND LEFT URETERAL STENT       Consults:   Consults     Date and Time Order Name Status Description    1/3/2017 0621 Inpatient Consult to Urology Completed           Pertinent Test Results:   Lab Results (last 7 days)     Procedure Component Value Units Date/Time    Urinalysis With / Culture If Indicated [[id number]]  (Abnormal) Collected:  01/03/17 0157    Specimen:  Urine from Urine, Clean Catch Updated:  01/03/17 0209     Color, UA Yellow      Appearance, UA Cloudy (A)      pH, UA 7.0      Specific Gravity, UA 1.020      Glucose, UA >=1000 mg/dL (3+) (A)      Ketones, UA  Negative      Bilirubin, UA Negative      Blood, UA Small (1+) (A)      Protein, UA Negative      Leuk Esterase, UA Negative      Nitrite, UA Negative      Urobilinogen, UA 1.0 E.U./dL     Urinalysis, Microscopic Only [[id number]]  (Abnormal) Collected:  01/03/17 0157    Specimen:  Urine from Urine, Clean Catch Updated:  01/03/17 0209     RBC, UA 7-12 (A) /HPF      WBC, UA 0-2 (A) /HPF      Bacteria, UA None Seen /HPF      Squamous Epithelial Cells, UA None Seen /HPF      Hyaline Casts, UA 0-6 /LPF      Methodology Automated Microscopy     CBC & Differential [[id number]] Collected:  01/03/17 0247    Specimen:  Blood Updated:  01/03/17 0301    Narrative:       The following orders were created for panel order CBC & Differential.  Procedure                               Abnormality         Status                     ---------                               -----------         ------                     CBC Auto Differential[[id number]]         Normal              Final result                 Please view results for these tests on the individual orders.    CBC Auto Differential [[id number]]  (Normal) Collected:  01/03/17 0247    Specimen:  Blood Updated:  01/03/17 0301     WBC 8.37 10*3/mm3      RBC 4.90 10*6/mm3      Hemoglobin 14.8 g/dL      Hematocrit 44.4 %      MCV 90.6 fL      MCH 30.2 pg      MCHC 33.3 g/dL      RDW 13.4 %      RDW-SD 44.2 fl      MPV 9.0 fL      Platelets 294 10*3/mm3      Neutrophil % 58.0 %      Lymphocyte % 31.7 %      Monocyte % 7.9 %      Eosinophil % 2.0 %      Basophil % 0.2 %      Immature Grans % 0.2 %      Neutrophils, Absolute 4.85 10*3/mm3      Lymphocytes, Absolute 2.65 10*3/mm3      Monocytes, Absolute 0.66 10*3/mm3      Eosinophils, Absolute 0.17 10*3/mm3      Basophils, Absolute 0.02 10*3/mm3      Immature Grans, Absolute 0.02 10*3/mm3     Comprehensive Metabolic Panel [[id number]]  (Abnormal) Collected:  01/03/17 0247    Specimen:  Blood Updated:  01/03/17 0320     Glucose 169 (H) mg/dL       BUN 9 mg/dL      Creatinine 0.80 mg/dL      Sodium 141 mmol/L      Potassium 3.7 mmol/L      Chloride 102 mmol/L      CO2 34.0 (H) mmol/L      Calcium 9.8 mg/dL      Total Protein 7.4 g/dL      Albumin 4.40 g/dL      ALT (SGPT) 50 (H) U/L      AST (SGOT) 49 (H) U/L      Alkaline Phosphatase 115 (H) U/L      Total Bilirubin 0.5 mg/dL      eGFR Non African Amer 100 mL/min/1.73      Globulin 3.0 gm/dL      A/G Ratio 1.5 g/dL      BUN/Creatinine Ratio 11.3      Anion Gap 5.0 mmol/L     Narrative:       National Kidney Foundation Guidelines    Stage                           Description                             GFR                      1                               Normal or High                          90+  2                               Mild decrease                            60-89  3                               Moderate decrease                   30-59  4                               Severe decrease                       15-29  5                               Kidney failure                             <15    POC Glucose Fingerstick [[id number]]  (Abnormal) Collected:  01/03/17 0717    Specimen:  Blood Updated:  01/03/17 0726     Glucose 169 (H) mg/dL     Narrative:       Meter: [id number] : [id number] Julian Edwards    Hemoglobin A1c [[id number]]  (Abnormal) Collected:  01/03/17 0756    Specimen:  Blood Updated:  01/03/17 1051     Hemoglobin A1C 9.10 (H) %     Narrative:       The American Diabetes Association recommends maintenance of Hemoglobin A1C at 7.0% or lower. Goals for Hemoglobin A1C reduction may need to be modified if hypoglycemia is a problem.    POC Glucose Fingerstick [[id number]]  (Abnormal) Collected:  01/03/17 1108    Specimen:  Blood Updated:  01/03/17 1111     Glucose 251 (H) mg/dL     Narrative:       Meter: [id number] : [id number] Julian Edwards    POC Glucose Fingerstick [[id number]]  (Abnormal) Collected:  01/03/17 1733    Specimen:  Blood Updated:  01/03/17 1736     Glucose 144 (H)  mg/dL     Narrative:       Meter: [id number] : [id number] Julian Edwards    POC Glucose Fingerstick [[id number]]  (Abnormal) Collected:  01/03/17 2059    Specimen:  Blood Updated:  01/03/17 2109     Glucose 191 (H) mg/dL     Narrative:       Meter: [id number] : [id number] Major Jake    POC Glucose Fingerstick [[id number]]  (Abnormal) Collected:  01/04/17 0723    Specimen:  Blood Updated:  01/04/17 0724     Glucose 169 (H) mg/dL     Narrative:       Meter: [id number] : [id number] Julian Edwards    Basic Metabolic Panel [[id number]]  (Abnormal) Collected:  01/04/17 0516    Specimen:  Blood Updated:  01/04/17 0731     Glucose 206 (H) mg/dL      BUN 18 mg/dL      Creatinine 1.10 mg/dL      Sodium 139 mmol/L      Potassium 4.5 mmol/L      Chloride 102 mmol/L      CO2 32.0 (H) mmol/L      Calcium 9.4 mg/dL      eGFR Non African Amer 69 mL/min/1.73      BUN/Creatinine Ratio 16.4      Anion Gap 5.0 mmol/L     Narrative:       National Kidney Foundation Guidelines    Stage                           Description                             GFR                      1                               Normal or High                          90+  2                               Mild decrease                            60-89  3                               Moderate decrease                   30-59  4                               Severe decrease                       15-29  5                               Kidney failure                             <15    POC Glucose Fingerstick [[id number]]  (Abnormal) Collected:  01/04/17 1103    Specimen:  Blood Updated:  01/04/17 1106     Glucose 195 (H) mg/dL     Narrative:       Meter: [id number] : [id number] Julian Edwards    POC Glucose Fingerstick [[id number]]  (Abnormal) Collected:  01/04/17 1720    Specimen:  Blood Updated:  01/04/17 1723     Glucose 191 (H) mg/dL     Narrative:       Meter: [id number] : [id number] Julian Edwards    POC Glucose Fingerstick [[id number]]  (Abnormal)  Collected:  01/04/17 2151    Specimen:  Blood Updated:  01/04/17 2157     Glucose 201 (H) mg/dL     Narrative:       Meter: [id number] : [id number] Baldemar Denis    POC Glucose Fingerstick [[id number]]  (Abnormal) Collected:  01/05/17 0715    Specimen:  Blood Updated:  01/05/17 0718     Glucose 163 (H) mg/dL     Narrative:       Meter: [id number] : [id number] Julian Edwards    POC Glucose Fingerstick [[id number]]  (Abnormal) Collected:  01/05/17 1107    Specimen:  Blood Updated:  01/05/17 1110     Glucose 162 (H) mg/dL     Narrative:       Meter: [id number] : [id number] Julian Edwards    POC Glucose Fingerstick [[id number]]  (Abnormal) Collected:  01/05/17 1652    Specimen:  Blood Updated:  01/05/17 1656     Glucose 211 (H) mg/dL     Narrative:       Meter: [id number] : [id number] Julian Edwards    POC Glucose Fingerstick [[id number]]  (Abnormal) Collected:  01/05/17 2109    Specimen:  Blood Updated:  01/05/17 2136     Glucose 213 (H) mg/dL     Narrative:       Meter: [id number] : [id number] Renzo Singh    POC Glucose Fingerstick [[id number]]  (Abnormal) Collected:  01/06/17 0806    Specimen:  Blood Updated:  01/06/17 0808     Glucose 168 (H) mg/dL     Narrative:       Meter: [id number] : [id number] Jan ARMAS    POC Glucose Fingerstick [[id number]]  (Abnormal) Collected:  01/06/17 1325    Specimen:  Blood Updated:  01/06/17 1344     Glucose 183 (H) mg/dL     Narrative:       Meter: [id number] : [id number] Hugo Palma    POC Glucose Fingerstick [[id number]]  (Abnormal) Collected:  01/06/17 1643    Specimen:  Blood Updated:  01/06/17 1644     Glucose 192 (H) mg/dL     Narrative:       Meter: [id number] : [id number] Riri Sigala    POC Glucose Fingerstick [[id number]]  (Abnormal) Collected:  01/06/17 2112    Specimen:  Blood Updated:  01/06/17 2113     Glucose 161 (H) mg/dL     Narrative:       Meter: [id number] : [id number] Myrna Philip    POC Glucose Fingerstick [[id number]]  (Abnormal)  Collected:  01/07/17 0713    Specimen:  Blood Updated:  01/07/17 0714     Glucose 148 (H) mg/dL     Narrative:       Meter: [id number] : [id number] Jean Carter        Imaging Results (all)     Procedure Component Value Units Date/Time    CT Abdomen Pelvis Without Contrast [[id number]] Collected:  01/03/17 0138     Updated:  01/03/17 0331    Narrative:       EXAM:    CT Abdomen and Pelvis Without Intravenous Contrast.    CLINICAL HISTORY:    56 years, male; Pain; Abdominal pain; Generalized; Prior surgery; Surgery   date: 6+ months; Additional info: Abd pain, dysuria    TECHNIQUE:    Axial computed tomography images of the abdomen and pelvis without   intravenous contrast.  This CT exam was performed using one or more of the   following dose reduction techniques:  automated exposure control, adjustment of   the mA and/or kV according to patient size, and/or use of iterative   reconstruction technique.    Coronal reformatted images were created and reviewed.    COMPARISON:    No relevant prior studies available.    FINDINGS:    Lower thorax:  There is mild subsegmental atelectasis and/or scarring in the   lung bases.     ABDOMEN:    Liver:  Unremarkable. No obvious liver masses appreciated on this   non-contrast exam.    Gallbladder and bile ducts:  Cholelithiasis is noted. No obvious gallbladder   wall thickening or pericholecytistic inflammatory changes. No significant   biliary ductal dilatation appreciated.    Pancreas:  Unremarkable.  No ductal dilation.    Spleen:  Unremarkable.  No splenomegaly.    Adrenals:  Unremarkable.  No adrenal nodules or masses identified.    Kidneys and ureters:  There is moderate left-sided hydronephrosis and   hydroureter.  A stone measuring 4 x 5 x 8 mm is visualized in the left distal   ureter, just proximal to the ureterovesicular junction. There are small   bilateral intrarenal stones.  Mild bilateral perinephric stranding.  No   right-sided hydronephrosis. Small bilateral  renal cysts.     PELVIS:    Bladder:  Unremarkable.  No stones.    Reproductive:  Unremarkable as visualized.    Appendix:  The appendix is not definitively visualized and is likely   surgically absent.     ABDOMEN and PELVIS:    Stomach and bowel:  No evidence of bowel obstruction. No significant bowel   wall thickening. Colonic diverticulosis is noted, without associated   inflammatory changes to suggest diverticulitis.    Peritoneum:  Unremarkable.  No significant fluid collection.  No free air.    Abdominal/pelvic wall:  Fat containing umbilical hernia.    Lymph nodes:  No significant lymph node enlargement.    Vasculature:  Atherosclerotic calcifications are noted. No aortic aneurysm.    Bones:  Degenerative changes of the spine and bilateral hips.  Increased   thoracic kyphosis.  No definite acute fracture.      Impression:       1.  Left distal ureteral stone with associated moderate hydronephrosis and   hydroureter.    2.  Bilateral intrarenal stones.  3.  Colonic diverticulosis without evidence of diverticulitis.      THIS DOCUMENT HAS BEEN ELECTRONICALLY SIGNED BY GALLO ALVARENGA MD    XR Abdomen KUB [[id number]] Collected:  01/04/17 1538     Updated:  01/06/17 1028    Narrative:          EXAMINATION: XR ABDOMEN KUB - 01/04/2017     INDICATION: STONE; N20.1-Calculus of ureter; N23-Unspecified renal  colic; N13.30-Unspecified hydronephrosis; N13.4-Hydroureter; E11.9-Type  2 diabetes mellitus without complications; Z79.4-Long term (current) use  of insulin; I10-Essential (primary) hypertension.      COMPARISON: None.     FINDINGS: Gas is present in multiple loops of large and small bowel.  There are no abnormally dilated loops of large or small bowel. The lung  bases are clear. Degenerative changes are present throughout the lumbar  spine. A few small densities project over the right renal shadow. Small  densities also project over the left renal shadow.       Impression:       Punctate calcifications projecting  "over both renal shadows.  Normal bowel gas pattern.  Calcifications throughout the lumbar spine suggesting ankylosing  spondylitis.     DICTATED:     01/04/2017  EDITED:          01/04/2017     This report was finalized on 1/6/2017 10:26 AM by Dr. Chan Montero MD.       FL C Arm During Surgery [[id number]] Collected:  01/06/17 1339     Updated:  01/06/17 1552    Narrative:       EXAMINATION: FLUOROSCOPY C-ARM DURING SURGERY-01/06/2017:     INDICATION: LEFT URETEROSCOPY WITH LT LASER LITHOTRIPSY ; N20.1-Calculus  of ureter; N23-Unspecified renal colic; N13.30-Unspecified  hydronephrosis; N13.4-Hydroureter; E11.9-Type 2 diabetes mellitus  without complications; Z79.4-Long term (current) use of insulin;  I10-Essential (primary) hypertension.         TECHNIQUE:  Use of fluoroscopy during a left lithotripsy and left renal  stent placement for stone disease and pain.     COMPARISON: NONE     FINDINGS:   1. The amount of fluoroscopic time used was apparently not recorded.     No permanent images were obtained. Please refer to the procedural note.       Impression:       Fluoroscopic time not recorded.     D:  01/06/2017  E:  01/06/2017            This report was finalized on 1/6/2017 3:50 PM by Dr. Vitor Porter MD.               Culture Data: None    Physical Exam on Discharge:    Visit Vitals   • /82 (BP Location: Right arm, Patient Position: Lying)   • Pulse 73   • Temp 99.1 °F (37.3 °C) (Oral)   • Resp 16   • Ht 60\" (152.4 cm)   • Wt 255 lb 1.3 oz (116 kg)   • SpO2 97%   • BMI 49.82 kg/m2     Gen-no acute distress, morbidly obese  CV-RRR, S1 S2 normal, no m/r/g  Resp-CTAB, no wheezes  Abd-soft, NT, ND, +BS  Ext-no edema  Neuro-A&Ox3, no focal deficits  Psych-appropriate mood      Discharge Disposition  Home or Self Care    Discharge Medications   Thomas Felder   Home Medication Instructions ERIK:[id number]    Printed on:01/07/17 1411   Medication Information                      adalimumab (HUMIRA) 40 MG/0.8ML " Prefilled Syringe Kit injection  Inject 40 mg under the skin 1 (One) Time.             albuterol (PROVENTIL HFA;VENTOLIN HFA) 108 (90 BASE) MCG/ACT inhaler  Inhale 2 puffs Every 4 (Four) Hours As Needed for wheezing.             aspirin 325 MG tablet  Take 325 mg by mouth Daily.             Canagliflozin (INVOKANA PO)  Take 100 mg by mouth Daily.             cetirizine (zyrTEC) 10 MG tablet  Take 10 mg by mouth Daily.             dicyclomine (BENTYL) 20 MG tablet  Take 20 mg by mouth Daily As Needed.             furosemide (LASIX) 40 MG tablet  Take 40 mg by mouth Daily.             HYDROcodone-acetaminophen (NORCO)  MG per tablet  Take 1 tablet by mouth 2 (Two) Times a Day.             insulin aspart (novoLOG FLEXPEN) 100 UNIT/ML solution pen-injector sc pen  Inject 30 Units under the skin 3 (Three) Times a Day With Meals.             insulin aspart prot-insulin aspart (novoLOG 70/30) (70-30) 100 UNIT/ML injection  Inject 60 Units under the skin 2 (Two) Times a Day With Meals.             isosorbide mononitrate (IMDUR) 30 MG 24 hr tablet  Take 30 mg by mouth Daily.             levoFLOXacin (LEVAQUIN) 500 MG tablet  Take 1 tablet by mouth Daily for 3 days. Indications: Urinary Tract Infection             Liraglutide (VICTOZA) 18 MG/3ML solution pen-injector  Inject 1.8 mg under the skin.             lisinopril (PRINIVIL,ZESTRIL) 2.5 MG tablet  Take 2.5 mg by mouth Daily.             metFORMIN (GLUCOPHAGE) 1000 MG tablet  Take 1,000 mg by mouth 2 (Two) Times a Day With Meals.             metoprolol succinate XL (TOPROL-XL) 100 MG 24 hr tablet  Take 100 mg by mouth 2 (Two) Times a Day.             omeprazole (priLOSEC) 20 MG capsule  Take 20 mg by mouth Daily.             oxyCODONE-acetaminophen (PERCOCET) 7.5-325 MG per tablet  Take 1 tablet by mouth Every 4 (Four) Hours As Needed for severe pain (7-10).             potassium chloride (KLOR-CON) 20 MEQ packet  Take 20 mEq by mouth Daily.             raNITIdine  (ZANTAC) 150 MG tablet  Take 150 mg by mouth 2 (Two) Times a Day.                 Discharge Diet: Low fat      Activity at Discharge: As tolerated      Follow-up Appointments  No future appointments.  Dr. Stanton as directed  PCP in 1 week      Time: Discharge 40 min

## 2017-04-17 ENCOUNTER — TRANSCRIBE ORDERS (OUTPATIENT)
Dept: ADMINISTRATIVE | Facility: HOSPITAL | Age: 57
End: 2017-04-17

## 2017-04-17 DIAGNOSIS — R07.9 CHEST PAIN, UNSPECIFIED TYPE: Primary | ICD-10-CM

## 2017-04-25 ENCOUNTER — TRANSCRIBE ORDERS (OUTPATIENT)
Dept: GENERAL RADIOLOGY | Facility: HOSPITAL | Age: 57
End: 2017-04-25

## 2017-04-25 ENCOUNTER — TRANSCRIBE ORDERS (OUTPATIENT)
Dept: LAB | Facility: HOSPITAL | Age: 57
End: 2017-04-25

## 2017-04-25 ENCOUNTER — HOSPITAL ENCOUNTER (OUTPATIENT)
Dept: GENERAL RADIOLOGY | Facility: HOSPITAL | Age: 57
Discharge: HOME | End: 2017-04-25
Admitting: UROLOGY

## 2017-04-25 DIAGNOSIS — N20.0 CALCULUS OF KIDNEY: ICD-10-CM

## 2017-04-25 DIAGNOSIS — N20.0 CALCULUS OF KIDNEY: Primary | ICD-10-CM

## 2017-04-25 DIAGNOSIS — E78.2 MIXED HYPERLIPIDEMIA: Primary | ICD-10-CM

## 2017-04-25 PROCEDURE — 74000 HC ABDOMEN KUB: CPT

## 2017-05-08 ENCOUNTER — APPOINTMENT (OUTPATIENT)
Dept: CARDIOLOGY | Facility: HOSPITAL | Age: 57
End: 2017-05-08

## (undated) DEVICE — CATH URETRL FLXITP POLLACK STD 5F 70CM

## (undated) DEVICE — SOL LR 1000ML

## (undated) DEVICE — GLV SURG SENSICARE W/ALOE PF LF 8 STRL

## (undated) DEVICE — GW PTFE FIXCORE STFF J/3MM .035 150CM

## (undated) DEVICE — MEDI-VAC NON-CONDUCTIVE SUCTION TUBING: Brand: CARDINAL HEALTH

## (undated) DEVICE — PK CYSTO-TUR BASIC 10

## (undated) DEVICE — NITINOL WIRE WITH HYDROPHILIC TIP: Brand: SENSOR

## (undated) DEVICE — CANNULA,ADULT,SOFT-TOUCH,7TUBE,SC: Brand: MEDLINE

## (undated) DEVICE — SENSR O2 OXIMAX FNGR A/ 18IN NONSTR

## (undated) DEVICE — MEDI-VAC YANKAUER SUCTION HANDLE W/BULBOUS TIP: Brand: CARDINAL HEALTH

## (undated) DEVICE — PAIRED WIRE HELICAL STONE RETRIEVAL BASKET: Brand: GEMINI